# Patient Record
Sex: FEMALE | Race: WHITE | NOT HISPANIC OR LATINO | Employment: FULL TIME | URBAN - METROPOLITAN AREA
[De-identification: names, ages, dates, MRNs, and addresses within clinical notes are randomized per-mention and may not be internally consistent; named-entity substitution may affect disease eponyms.]

---

## 2017-10-18 ENCOUNTER — TELEPHONE (OUTPATIENT)
Dept: MIDWIFE SERVICES | Facility: CLINIC | Age: 37
End: 2017-10-18

## 2017-10-19 NOTE — TELEPHONE ENCOUNTER
Patient is about 6 weeks pregnant. First day of last period was 9/6/17. Please call to schedule. Okay to leave detailed messages.    Yari MALDONADO  Central Scheduler

## 2017-10-25 ENCOUNTER — PRENATAL OFFICE VISIT (OUTPATIENT)
Dept: NURSING | Facility: CLINIC | Age: 37
End: 2017-10-25
Payer: COMMERCIAL

## 2017-10-25 VITALS
BODY MASS INDEX: 21.47 KG/M2 | DIASTOLIC BLOOD PRESSURE: 79 MMHG | TEMPERATURE: 98.1 F | HEART RATE: 66 BPM | HEIGHT: 68 IN | WEIGHT: 141.7 LBS | SYSTOLIC BLOOD PRESSURE: 125 MMHG

## 2017-10-25 DIAGNOSIS — O09.529 AMA (ADVANCED MATERNAL AGE) MULTIGRAVIDA 35+: Primary | ICD-10-CM

## 2017-10-25 PROBLEM — Z23 NEED FOR TDAP VACCINATION: Status: ACTIVE | Noted: 2017-10-25

## 2017-10-25 LAB
ABO + RH BLD: NORMAL
ABO + RH BLD: NORMAL
ALBUMIN UR-MCNC: NEGATIVE MG/DL
APPEARANCE UR: CLEAR
BILIRUB UR QL STRIP: NEGATIVE
BLD GP AB SCN SERPL QL: NORMAL
BLOOD BANK CMNT PATIENT-IMP: NORMAL
COLOR UR AUTO: YELLOW
ERYTHROCYTE [DISTWIDTH] IN BLOOD BY AUTOMATED COUNT: 13.2 % (ref 10–15)
GLUCOSE UR STRIP-MCNC: NEGATIVE MG/DL
HCT VFR BLD AUTO: 36.6 % (ref 35–47)
HGB BLD-MCNC: 12.5 G/DL (ref 11.7–15.7)
HGB UR QL STRIP: NEGATIVE
KETONES UR STRIP-MCNC: NEGATIVE MG/DL
LEUKOCYTE ESTERASE UR QL STRIP: NEGATIVE
MCH RBC QN AUTO: 30.6 PG (ref 26.5–33)
MCHC RBC AUTO-ENTMCNC: 34.2 G/DL (ref 31.5–36.5)
MCV RBC AUTO: 90 FL (ref 78–100)
NITRATE UR QL: NEGATIVE
PH UR STRIP: 6 PH (ref 5–7)
PLATELET # BLD AUTO: 293 10E9/L (ref 150–450)
RBC # BLD AUTO: 4.09 10E12/L (ref 3.8–5.2)
SOURCE: NORMAL
SP GR UR STRIP: 1.02 (ref 1–1.03)
SPECIMEN EXP DATE BLD: NORMAL
UROBILINOGEN UR STRIP-ACNC: 0.2 EU/DL (ref 0.2–1)
WBC # BLD AUTO: 11 10E9/L (ref 4–11)

## 2017-10-25 PROCEDURE — 86762 RUBELLA ANTIBODY: CPT | Performed by: ADVANCED PRACTICE MIDWIFE

## 2017-10-25 PROCEDURE — 87389 HIV-1 AG W/HIV-1&-2 AB AG IA: CPT | Performed by: ADVANCED PRACTICE MIDWIFE

## 2017-10-25 PROCEDURE — 87086 URINE CULTURE/COLONY COUNT: CPT | Performed by: ADVANCED PRACTICE MIDWIFE

## 2017-10-25 PROCEDURE — 36415 COLL VENOUS BLD VENIPUNCTURE: CPT | Performed by: ADVANCED PRACTICE MIDWIFE

## 2017-10-25 PROCEDURE — 86900 BLOOD TYPING SEROLOGIC ABO: CPT | Performed by: ADVANCED PRACTICE MIDWIFE

## 2017-10-25 PROCEDURE — 81003 URINALYSIS AUTO W/O SCOPE: CPT | Performed by: ADVANCED PRACTICE MIDWIFE

## 2017-10-25 PROCEDURE — 86850 RBC ANTIBODY SCREEN: CPT | Performed by: ADVANCED PRACTICE MIDWIFE

## 2017-10-25 PROCEDURE — 86780 TREPONEMA PALLIDUM: CPT | Performed by: ADVANCED PRACTICE MIDWIFE

## 2017-10-25 PROCEDURE — 86901 BLOOD TYPING SEROLOGIC RH(D): CPT | Performed by: ADVANCED PRACTICE MIDWIFE

## 2017-10-25 PROCEDURE — 99207 ZZC NO CHARGE NURSE ONLY: CPT

## 2017-10-25 PROCEDURE — 87340 HEPATITIS B SURFACE AG IA: CPT | Performed by: ADVANCED PRACTICE MIDWIFE

## 2017-10-25 PROCEDURE — 85027 COMPLETE CBC AUTOMATED: CPT | Performed by: ADVANCED PRACTICE MIDWIFE

## 2017-10-25 RX ORDER — PNV NO.95/FERROUS FUM/FOLIC AC 28MG-0.8MG
TABLET ORAL
COMMUNITY
End: 2022-03-01

## 2017-10-25 NOTE — NURSING NOTE
"Chief Complaint   Patient presents with     Prenatal Care       Initial /79  Pulse 66  Temp 98.1  F (36.7  C)  Ht 5' 8\" (1.727 m)  Wt 141 lb 11.2 oz (64.3 kg)  LMP 09/06/2017  BMI 21.55 kg/m2 Estimated body mass index is 21.55 kg/(m^2) as calculated from the following:    Height as of this encounter: 5' 8\" (1.727 m).    Weight as of this encounter: 141 lb 11.2 oz (64.3 kg).  Medication Reconciliation: complete    "

## 2017-10-25 NOTE — MR AVS SNAPSHOT
After Visit Summary   10/25/2017    Moon Ordoñez    MRN: [id number]           Patient Information     Date Of Birth          1980        Visit Information        Provider Department      10/25/2017 3:30 PM RD OB NURSE EDUCATION [facility]        Today's Diagnoses     AMA (advanced maternal age) multigravida 35+    -  1       Follow-ups after your visit        Your next 10 appointments already scheduled     Nov 17, 2017  4:30 PM CST   New Prenatal with DORINDA Nevarez CNM   [facility] ([facility])    [address] [place] [id number] [phone]              Who to contact     If you have questions or need follow up information about today's clinic visit or your schedule please contact [facility] directly at [phone].  Normal or non-critical lab and imaging results will be communicated to you by MyChart, letter or phone within 4 business days after the clinic has received the results. If you do not hear from us within 7 days, please contact the clinic through [employer] or phone. If you have a critical or abnormal lab result, we will notify you by phone as soon as possible.  Submit refill requests through [employer] or call your pharmacy and they will forward the refill request to us. Please allow 3 business days for your refill to be completed.          Additional Information About Your Visit        MyChart Information     [employer] gives you secure access to your electronic health record. If you see a primary care provider, you can also send messages to your care team and make appointments. If you have questions, please call your primary care clinic.  If you do not have a primary care provider, please call [phone] and they will assist you.        Care EveryWhere ID     This is your Care EveryWhere ID. This could be used by other organizations to access your [facility]  "records  [id number]        Your Vitals Were     Pulse Temperature Height Last Period BMI (Body Mass Index)       66 98.1  F (36.7  C) 5' 8\" (1.727 m) 09/06/2017 21.55 kg/m2        Blood Pressure from Last 3 Encounters:   10/25/17 125/79    Weight from Last 3 Encounters:   10/25/17 141 lb 11.2 oz (64.3 kg)              We Performed the Following     ABO/RH Type and Screen     Anti Treponema     CBC with Platelets     Hepatitis B surface antigen     HIV Antigen Antibody Combo     Rubella Antibody IgG Quantitative     UA without Microscopic     Urine Culture Aerobic Bacterial        Primary Care Provider Office Phone # Fax #    Marychuy Murphy [phone]       [facility] [address] [facility] [address]        Equal Access to Services     GILL QUICK : Hadii aad ku hadasho Soomaali, waaxda luqadaha, qaybta kaalmada adeegyada, jose shen . So [facility] [phone].    ATENCIÓN: Si habla español, tiene a bentiez disposición servicios gratuitos de asistencia lingüística. Llame al [phone].    We comply with applicable federal civil rights laws and [place] laws. We do not discriminate on the basis of race, color, national origin, age, disability, sex, sexual orientation, or gender identity.            Thank you!     Thank you for choosing [facility]  for your care. Our goal is always to provide you with excellent care. Hearing back from our patients is one way we can continue to improve our services. Please take a few minutes to complete the written survey that you may receive in the mail after your visit with us. Thank you!             Your Updated Medication List - Protect others around you: Learn how to safely use, store and throw away your medicines at www.disposemymeds.org.          This list is accurate as of: 10/25/17  4:00 PM.  Always use your most recent med list.                   Brand Name Dispense Instructions for use " Diagnosis    Prenatal Vitamin 27-0.8 MG Tabs

## 2017-10-25 NOTE — PROGRESS NOTES
Patient presents for new ob teaching and labs, first pregnancy. Declined first trimester screening. Handouts reviewed and given. Patient complain of slight nausea, recommended B6 and Unisom. Has appointment with CNM 11/17/17    Caffeine intake/servings daily - 1  Calcium intake/servings daily - 3  Exercise 5 times weekly - describe ; bikes, cardio, gym membership, exercise precautions given  Sunscreen used - Yes  Seatbelts used - Yes  Guns stored in the home - No  Self Breast Exam - Yes  Pap test up to date -  Yes, done at [facility] 8/21/15 normal  Eye exam up to date -  Yes  Dental exam up to date -  Yes  DEXA scan up to date -  No  Flex Sig/Colonoscopy up to date -  No  Mammography up to date -  No  Immunizations reviewed and up to date - Yes  Abuse: Current or Past (Physical, Sexual or Emotional) - No  Do you feel safe in your environment - Yes  Do you cope well with stress - Yes  Do you suffer from insomnia - No    Prenatal OB Questionnaire  Past Medical History  Diabetes   No  Hypertension   No  Heart Disease, mitral valve prolapse, or rheumatic fever?   No  An autoimmune disorder such as Lupus or Rheumatoid Arthritis?   No  Kidney Disease or Urinary Tract Infection?   No  Epilepsy, seizures or spells?   No  Migraine headaches?   No  A stroke or loss of function or sensation?   No  Any other neurological problems?   No  Have you ever been treated for depression?  No  Are you having problems with crying spells or loss of self-esteem?   No  Have you ever required psychiatric care?   No  Have you ever hepatitis, liver disease or jaundice?   No  Have you ever been treated for blood clots in your veins, deep venous thrombosis, inflammation in the veins, thrombosis, phlebitis, pulmonary embolism or varicosities?   No  Have you had excessive bleeding after surgery or dental work?   No  Do you bleed more than other women after a cut or scratch?   No  Do you have a history of anemia?   No  Have you ever been  treated for thyroid problems or taken thyroid medication?  No  Do you have any other endocrine problems?  No  Have you ever been in a major accident or suffered serious trauma?   No  Within the last year, has anyone hit slapped, kicked or otherwise hurt you?  No  In the last year, has anyone forced you to have sex when you didn't want to?  No  Have you ever had a blood transfusion?   No  Would you refuse a blood transfusion if a doctor judged it to be medically necessary?   No  If you answered yes, would you rather die than have a blood transfusion?   No  If you answered yes, is this for Yazidi reasons?   No  Does anyone in your home smoke?   No  Do you use tobacco products?  No  Do you drink beer, wine, hard liquor?  No  Do you use any of the following: marijuana, speed, cocaine, heroine, hallucinogens, or other drugs?  No  Is your blood type Rh negative?   No  Have you ever had abnormal antibodies in your blood?   No  Have you ever had asthma?   No  Have you ever had tuberculosis?   No  Do you have any allergies to drugs or over-the-counter medications?   No    Allergies as of 10/25/2017:    Allergies as of 10/25/2017     (No Known Allergies)       Do you have any breast problems?   No  Have you ever ?   No  Have you had any gynecological surgical procedures such as cervical conization, a LEEP procedure, laser treatment, cryosurgery of the cervix, or a dilation and curettage, etc?  No  Have you had any other surgical procedures?  No  Have you been hospitalized for a nonsurgical reason excluding normal delivery?   No  Have you ever had any anesthetic complications?   No  Have you ever had an abnormal pap smear?   No  Do you have a history of abnormalities of the uterus?   No  Did it take you more than one year to become pregnant?   No  Have you ever been evaluated or treated for infertility?   No  Is there a history of medical problems in your family, which you feel might adversely affect your health or  pregnancy?   No  Do you have any other problems we have not asked you about which you feel may be important to this pregnancy?  No    Symptoms since Last Menstrual Period  Do you have any of the following:    *abdominal pain  No  *blood in stool or urine  No  *chest pain  No  *shortness of breath  No  *coughing or vomiting up blood No  *heart racing or skipping beats  No  *nausea and vomiting  Yes  *pain with urination  No  *vaginal discharge or bleeding  No  Current medications are:  Current Outpatient Prescriptions   Medication Sig Dispense Refill     Prenatal Vit-Fe Fumarate-FA (PRENATAL VITAMIN) 27-0.8 MG TABS          Genetic Screening  At the time of birth, will you be 35 years old or older?  No  Has the patient, baby s father, or anyone in either family had:  Thalassemia (Italian, Greek, Mediterranean, or  background only) and an MCV result less than 80?  No  Neural tube defect such as meningomyelocele, spina bifida or anencephaly?  No  Congenital heart defect?  No  Down s syndrome?  No  Maximiliano-Sach s disease (Mandaen, Cajun, Bhutanese-Macedonian)?  No  Sickle cell disease or trait (Krystle)?  No  Hemophilia or other inherited problems of blood coagulation? No  Muscular dystrophy?  No  Cystic Fibrosis?  No  Coffey s chorea?  No  Mental retardation/autism? No   If yes, was the person tested for fragile X?  No  Any other inherited genetic or chromosomal disorder?  No  Maternal metabolic disorder (e.g. insulin-dependent diabetes, PKU)? No  A child with birth defects not listed above?  No  Recurrent pregnancy loss or a stillbirth?  No  Has the patient had any medications/street drugs/alcohol since her last menstrual period? No  Does the patient or baby s father have any other genetic risks?  No  Infection History  Do you object to being tested for Hepatitis B? No  Do you object to being tested for HIV? No  Do you feel that you are at high risk for coming in contact with the AIDS virus?  No  Have you ever been  treated for tuberculosis?  No  Have you ever received the BCG vaccine for tuberculosis?  No  Have you ever had a positive skin test for tuberculosis? No  Do you live with someone who has tuberculosis?  No  Have you ever been exposed to tuberculosis?  No  Do you have genital herpes?  No  Does your partner have genital herpes?  No  Have you had a rash or viral illness since your last period?  No  Have you ever had Gonorrhea, Chlamydia, Syphilis, venereal warts, trichomoniasis, pelvic inflammatory disease or any other sexually transmitted disease?  No  Do you know if you are a genital group B streptococcus carrier? No  You have not had chicken pox/varicella  Yes  Have you been vaccinated against chicken pox?  No  Have you had any other infectious disease? No        Early ultrasound screening tool:    Does patient have irregular periods?  No  Did patient use hormonal birth control in the three months prior to positive urine pregnancy test? No  Is the patient breastfeeding?  No  Is the patient 10 weeks or greater at time of education visit?  No

## 2017-10-26 LAB
BACTERIA SPEC CULT: NORMAL
HBV SURFACE AG SERPL QL IA: NONREACTIVE
HIV 1+2 AB+HIV1 P24 AG SERPL QL IA: NONREACTIVE
RUBV IGG SERPL IA-ACNC: 91 IU/ML
SPECIMEN SOURCE: NORMAL
T PALLIDUM IGG+IGM SER QL: NEGATIVE

## 2017-10-28 ENCOUNTER — HEALTH MAINTENANCE LETTER (OUTPATIENT)
Age: 37
End: 2017-10-28

## 2017-11-17 ENCOUNTER — PRENATAL OFFICE VISIT (OUTPATIENT)
Dept: MIDWIFE SERVICES | Facility: CLINIC | Age: 37
End: 2017-11-17
Payer: COMMERCIAL

## 2017-11-17 VITALS
DIASTOLIC BLOOD PRESSURE: 87 MMHG | SYSTOLIC BLOOD PRESSURE: 140 MMHG | BODY MASS INDEX: 22.13 KG/M2 | HEART RATE: 73 BPM | WEIGHT: 146 LBS | HEIGHT: 68 IN

## 2017-11-17 DIAGNOSIS — O09.521 ELDERLY MULTIGRAVIDA IN FIRST TRIMESTER: Primary | ICD-10-CM

## 2017-11-17 PROBLEM — Z34.00 SUPERVISION OF NORMAL FIRST PREGNANCY: Status: ACTIVE | Noted: 2017-11-17

## 2017-11-17 PROCEDURE — 99207 ZZC FIRST OB VISIT: CPT | Performed by: ADVANCED PRACTICE MIDWIFE

## 2017-11-17 NOTE — PROGRESS NOTES
"10w2d   Moon Ordoñez is a 36 year old who presents to the clinic for an new ob visit. She is not a previous CNM patient. They have good questions today about pregnancy in general otherwise no concerns. Feeling well, nausea is starting to resolve. Patient is AMA, they would like to complete genetic testing, ordered through [facility]. Interested in shared wisdom, information given for the [facility] group.     Estimated Date of Delivery: Jun 13, 2018 is calculated from Patient's last menstrual period was 09/06/2017.     She has not had bleeding since her LMP.   She has had mild nausea. Weigh loss has not occurred.   This was a planned pregnancy.   FOB is involved,  Reyes   OTHER CONCERNS: no concerns     INFECTION HISTORY  HIV: no  Hepatitis B: no  Hepatitis C: no  Syphilis:  no  Tuberculosis: no   PPD- no  Herpes self: no  Herpes partner:  no  Chlamydia:  no  Gonorrhea:  no  HPV: no  BV:  no  Trichomonis:  no  Chicken Pox:  YES  ====================================================  GENETIC SCREENING  Genetic screening reviewed. High Risk? No   ====================================================  PERSONAL/SOCIAL HISTORY  Lives lives with their spouse.  Employment: Full time.  Her job involves light activity .  HX OF ABUSE: no  =====================================================   REVIEW OF SYSTEMS  C: NEGATIVE for fever, chills  E: NEGATIVE for vision changes   R: NEGATIVE for significant cough or SOB  CV: NEGATIVE for chest pain, palpitations   GI: NEGATIVE for nausea, abdominal pain, heartburn, or change in bowel habits  : NEGATIVE for frequency, dysuria, or hematuria  M: NEGATIVE for significant arthralgias or myalgia  N: NEGATIVE for weakness, dizziness or paresthesias or headache  ====================================================    PHYSICAL EXAM:  /87  Pulse 73  Ht 5' 8\" (1.727 m)  Wt 146 lb (66.2 kg)  LMP 09/06/2017  BMI 22.2 kg/m2  BMI- Body mass index is 22.2 kg/(m^2).,     RECOMMENDED WEIGHT " GAIN: 15-25 lbs.  PHQ9- Today's Depression Rating was No Value exists for the : HP#PHQ9  GENERAL:  Pleasant pregnant female, alert, well groomed.   SKIN:  Warm and dry, without lesions or rashes  HEAD: Symmetrical features.  MOUTH:  Buccal mucosa pink, moist without lesions.    NECK:  Thyroid without enlargement and nodules.  Lymph nodes not palpable.   LUNGS:  Clear to auscultation.  HEART:  RRR without murmur.  ABDOMEN: Soft without masses , tenderness or organomegaly.  No CVA tenderness. No scars noted.     MUSCULOSKELETAL:  Full range of motion  EXTREMITIES:  No edema. No significant varicosities.   GENITALIA: deferred.    =========================================  ASSESSMENT:  10w2d  AMA    PREGNANCY AT RISK? no  ==========================================  PLAN:  Instructed on use of triage nurse line and contacting the on call CNM after hours for an urgent need such as fever, vagina bleeding, bladder or vaginal infection, rupture of membranes,  or term labor.    Discussed the indications, uses for and false positives for quad screen, nuchal translucency and fetal survey ultrasound at 18-20 weeks gestation. Ordered today.   Instructed on best evidence for: weight gain for her BMI for pregnancy; healthy diet and foods to avoid; exercise and activity during pregnancy;avoiding exposure to toxoplasmosis; and maintenance of a generally healthy lifestyle.   Discussed the harms, benefits, side effects and alternative therapies for current prescribed and OTC medications.  Follow up in 4 weeks.     DORINDA Nevarez CNM

## 2017-11-17 NOTE — Clinical Note
Please abstract the following data from this visit with this patient into the appropriate field in Epic:  Pap smear done on this date: 08/21/2015 (approximately), by this group: [facility], results were NIL.

## 2017-11-17 NOTE — NURSING NOTE
"Chief Complaint   Patient presents with     Prenatal Care       Initial /87  Pulse 73  Ht 5' 8\" (1.727 m)  Wt 146 lb (66.2 kg)  LMP 2017  BMI 22.2 kg/m2 Estimated body mass index is 22.2 kg/(m^2) as calculated from the following:    Height as of this encounter: 5' 8\" (1.727 m).    Weight as of this encounter: 146 lb (66.2 kg).  BP completed using cuff size: regular        The following HM Due: NONE      The following patient reported/Care Every where data was sent to:  P ABSTRACT QUALITY INITIATIVES [[id number]]  na     n/a             "

## 2017-11-17 NOTE — MR AVS SNAPSHOT
After Visit Summary   11/17/2017    Moon Ordoñez    MRN: [id number]           Patient Information     Date Of Birth          1980        Visit Information        Provider Department      11/17/2017 4:00 PM Francesca Holt APRN CNM [facility]        Today's Diagnoses     Elderly multigravida in first trimester    -  1       Follow-ups after your visit        Additional Services     MAT FETAL MED CTR REFERRAL-PREGNANCY       >> Patient may proceed with recommendations for further testing as directed by the Maternal Fetal Medicine Specialist >>    >> If requesting Fetal Echo: M will determine appropriate location for exam due to indication.    >> If requesting Lung Maturity Amnio:  If results indicate fetal lung maturity, induction or C/S is recommended within 36 hours.  Please schedule accordingly.     Dear Patient:   Please be aware that coverage of these services is subject to the terms and limitations of your health insurance plan.  Call member services at your health plan with any benefit or coverage questions.      Please bring the following to your appointment:    >>  Any x-rays, CTs or MRIs which have been performed.  Contact the facility where they were done to arrange for  prior to your scheduled appointment.  Any new CT, MRI or other procedures ordered by your specialist must be performed at a [facility] facility or coordinated by your clinic's referral office.  >>  List of current medications   >>  This referral request   >>  Any documents/labs given to you for this referral                  Your next 10 appointments already scheduled     Dec 19, 2017  5:30 PM CST   ESTABLISHED PRENATAL with DORINDA Santana CNM   [facility] ([facility])    [address] [place] [id number] [phone]              Who to contact     If you have questions or need follow up information about today's  "clinic visit or your schedule please contact [facility] directly at [phone].  Normal or non-critical lab and imaging results will be communicated to you by MyChart, letter or phone within 4 business days after the clinic has received the results. If you do not hear from us within 7 days, please contact the clinic through [employer] or phone. If you have a critical or abnormal lab result, we will notify you by phone as soon as possible.  Submit refill requests through "[employer] or call your pharmacy and they will forward the refill request to us. Please allow 3 business days for your refill to be completed.          Additional Information About Your Visit        [employer] Information     "[employer] gives you secure access to your electronic health record. If you see a primary care provider, you can also send messages to your care team and make appointments. If you have questions, please call your primary care clinic.  If you do not have a primary care provider, please call [phone] and they will assist you.        Care EveryWhere ID     This is your Care EveryWhere ID. This could be used by other organizations to access your [facility] medical records  [id number]        Your Vitals Were     Pulse Height Last Period Breastfeeding? BMI (Body Mass Index)       73 5' 8\" (1.727 m) 09/06/2017 Unknown 22.2 kg/m2        Blood Pressure from Last 3 Encounters:   11/17/17 140/87   10/25/17 125/79    Weight from Last 3 Encounters:   11/17/17 146 lb (66.2 kg)   10/25/17 141 lb 11.2 oz (64.3 kg)              We Performed the Following     MAT FETAL MED CTR REFERRAL-PREGNANCY        Primary Care Provider Office Phone # Fax #    Marychuy Murphy [phone]        [facility] [address] [facility] [address]        Equal Access to Services     GILL QUICK : Shelia Mendieta, ashanti velasquez, lila dougherty, jose maddox. So wa " [phone].    ATENCIÓN: Si habla vivien, tiene a benitez disposición servicios gratuitos de asistencia lingüística. Nae al [phone].    We comply with applicable federal civil rights laws and [place] laws. We do not discriminate on the basis of race, color, national origin, age, disability, sex, sexual orientation, or gender identity.            Thank you!     Thank you for choosing [facility]  for your care. Our goal is always to provide you with excellent care. Hearing back from our patients is one way we can continue to improve our services. Please take a few minutes to complete the written survey that you may receive in the mail after your visit with us. Thank you!             Your Updated Medication List - Protect others around you: Learn how to safely use, store and throw away your medicines at www.disposemymeds.org.          This list is accurate as of: 11/17/17  5:21 PM.  Always use your most recent med list.                   Brand Name Dispense Instructions for use Diagnosis    Prenatal Vitamin 27-0.8 MG Tabs

## 2017-12-06 ENCOUNTER — PRE VISIT (OUTPATIENT)
Dept: MATERNAL FETAL MEDICINE | Facility: CLINIC | Age: 37
End: 2017-12-06

## 2017-12-08 ENCOUNTER — OFFICE VISIT (OUTPATIENT)
Dept: MATERNAL FETAL MEDICINE | Facility: CLINIC | Age: 37
End: 2017-12-08
Attending: ADVANCED PRACTICE MIDWIFE
Payer: COMMERCIAL

## 2017-12-08 ENCOUNTER — HOSPITAL ENCOUNTER (OUTPATIENT)
Dept: ULTRASOUND IMAGING | Facility: CLINIC | Age: 37
Discharge: HOME | End: 2017-12-08
Attending: ADVANCED PRACTICE MIDWIFE | Admitting: SOCIAL WORKER
Payer: COMMERCIAL

## 2017-12-08 DIAGNOSIS — O26.90 PREGNANCY RELATED CONDITION, ANTEPARTUM: ICD-10-CM

## 2017-12-08 DIAGNOSIS — O09.521 ELDERLY MULTIGRAVIDA IN FIRST TRIMESTER: Primary | ICD-10-CM

## 2017-12-08 DIAGNOSIS — O09.521 ELDERLY MULTIGRAVIDA IN FIRST TRIMESTER: ICD-10-CM

## 2017-12-08 DIAGNOSIS — Z36.82 ENCOUNTER FOR (NT) NUCHAL TRANSLUCENCY SCAN: ICD-10-CM

## 2017-12-08 PROCEDURE — 36415 COLL VENOUS BLD VENIPUNCTURE: CPT | Performed by: OBSTETRICS & GYNECOLOGY

## 2017-12-08 PROCEDURE — 96040 ZZH GENETIC COUNSELING, EACH 30 MINUTES: CPT | Mod: ZF | Performed by: GENETIC COUNSELOR, MS

## 2017-12-08 PROCEDURE — 76813 OB US NUCHAL MEAS 1 GEST: CPT

## 2017-12-08 PROCEDURE — 40000791 ZZHCL STATISTIC VERIFI PRENATAL TRISOMY 21,18,13: Performed by: OBSTETRICS & GYNECOLOGY

## 2017-12-08 NOTE — MR AVS SNAPSHOT
After Visit Summary   12/8/2017    Moon Ordoñez    MRN: [id number]           Patient Information     Date Of Birth          1980        Visit Information        Provider Department      12/8/2017 10:45 AM Anne Marie Yarbrough MD [facility] Maternal Fetal Medicine [facility]        Today's Diagnoses     Elderly multigravida in first trimester    -  1    Encounter for (NT) nuchal translucency scan           Follow-ups after your visit        Your next 10 appointments already scheduled     Dec 19, 2017  5:30 PM CST   ESTABLISHED PRENATAL with DORINDA Santana CNM   [facility] ([facility])    [address] [facility]  [address]  [facility] [address] [phone]            Jan 19, 2018  1:30 PM CST   (Arrive by 1:15 PM)   MFM US COMP with [id number]   [facility] Maternal Fetal Medicine Ultrasound - [facility])    [address]  [facility] [id number] [phone]           Wear comfortable clothes and leave your valuables at home.            Jan 19, 2018  2:00 PM CST   Radiology MD with UR BRAD JOINER   [facility] Maternal Fetal Medicine - [facility])    [address]  [facility] [id number] [phone]           Please arrive at the time given for your first appointment. This visit is used internally to schedule the physician's time during your ultrasound.              Who to contact     If you have questions or need follow up information about today's clinic visit or your schedule please contact [facility] MATERNAL FETAL MEDICINE [facility] directly at [phone].  Normal or non-critical lab and imaging results will be communicated to you by MyChart, letter or phone within 4 business days after the clinic has received the results. If you do not hear from us within 7 days, please contact the clinic through MyChart or phone. If you have a critical  or abnormal lab result, we will notify you by phone as soon as possible.  Submit refill requests through PetroDE or call your pharmacy and they will forward the refill request to us. Please allow 3 business days for your refill to be completed.          Additional Information About Your Visit        Sakti3hart Information     [employer] gives you secure access to your electronic health record. If you see a primary care provider, you can also send messages to your care team and make appointments. If you have questions, please call your primary care clinic.  If you do not have a primary care provider, please call [phone] and they will assist you.        Care EveryWhere ID     This is your Care EveryWhere ID. This could be used by other organizations to access your [place] medical records  [id number]        Your Vitals Were     Last Period                   09/06/2017            Blood Pressure from Last 3 Encounters:   11/17/17 140/87   10/25/17 125/79    Weight from Last 3 Encounters:   11/17/17 66.2 kg (146 lb)   10/25/17 64.3 kg (141 lb 11.2 oz)              Today, you had the following     No orders found for display       Primary Care Provider Office Phone # Fax #    Marychuy Murphy [phone]       [facility] [address] [facility] [address]        Equal Access to Services     GILL QUICK AH: Hadii noe ku hadasho Soomaali, waaxda luqadaha, qaybta kaalmada adeegyada, jose maddox. So [facility] [phone].    ATENCIÓN: Si habla español, tiene a benitez disposición servicios gratuitos de asistencia lingüística. Llame al [phone].    We comply with applicable federal civil rights laws and [place] laws. We do not discriminate on the basis of race, color, national origin, age, disability, sex, sexual orientation, or gender identity.            Thank you!     Thank you for choosing [facility]  for your care. Our goal is  always to provide you with excellent care. Hearing back from our patients is one way we can continue to improve our services. Please take a few minutes to complete the written survey that you may receive in the mail after your visit with us. Thank you!             Your Updated Medication List - Protect others around you: Learn how to safely use, store and throw away your medicines at www.disposemymeds.org.          This list is accurate as of: 12/8/17 12:50 PM.  Always use your most recent med list.                   Brand Name Dispense Instructions for use Diagnosis    Prenatal Vitamin 27-0.8 MG Tabs

## 2017-12-08 NOTE — PROGRESS NOTES
[facility] Maternal Fetal Medicine Center  Genetic Counseling Consult    Patient: Moon Ordoñez YOB: 1980     Date of Service: 17     Referring Provider: Francesca Holt CNM        Moon Ordoñez was seen, along with her , Reyes, at [facility] M[facility]etal [facility] Center for genetic consultation to discuss the options for screening and testing for fetal chromosome abnormalities.  The indication for genetic counseling is advanced maternal age.        Impression/Plan:   1.  Moon had an ultrasound and blood draw for NIPT (Innatal test through [facility]).  Results are expected within 7-10 days, and will be available in [facility].  We will contact her to discuss the results, and a copy will be forwarded to the office of the referring OB provider.    2.  Maternal serum AFP (single marker screen) is recommended after 15 weeks to screen for open neural tube defects.  A quad screen should not be performed.    3.  An 18-20 week comprehensive ultrasound is standard of care for all women 35 or older at delivery.  This has been scheduled for 18.    Pregnancy History:   /Parity:   Age at Delivery: 36 year old  KELTON: 2018, by LMP  Gestational Age: 13w2d    No significant complications or exposures were reported in the current pregnancy.    Medical History:   Moon s reported medical history is not expected to impact pregnancy management or risks to fetal development.  Moon is 36 years of age and she is healthy.  Her , Reyes, is 38 years of age.  He reports a history of seizures but he is otherwise healthy.       Family History:   A three-generation pedigree was obtained and is scanned under the  Media  tab.  The following significant findings were reported:      As mentioned above, Reyes has a history of seizures with onset at age 14 following a surgery.  He is not aware of any other similarly affected relatives.  We discussed how many  forms of epilepsy have a genetic basis, and that the couple should be sure to share this information with their pediatrician.      One of Reyes's paternal aunts  in her late 60's from complications of scleroderma.  We discussed that most autoimmune disorders are associated with some degree of genetic susceptibility, and that this family history should prompt heightened awareness.      Moon has a paternal half sister who had thyroid cancer in her 20's.  Moon's father had prostate cancer at age 60.  Although this family history is not necessarily strongly suggestive of a hereditary cancer syndrome, this family history should also prompt heightened awareness.      Moon's maternal grandmother reportedly had multiple miscarriages.  Recurrent pregnancy loss can have many causes, and in many cases the underlying cause is never identified.  If Moon were to experience multiple miscarriages in the future, it would be reasonable to offer a recurrent miscarriage evaluation including blood chromosome testing.    The reported family history is otherwise negative for multiple miscarriages, stillbirths, birth defects, mental retardation, known genetic conditions, and consanguinity.       Carrier Screening:   The patient reports that she and the father of the pregnancy have  ancestry:       Cystic fibrosis is an autosomal recessive genetic condition that occurs with increased frequency in individuals of  ancestry, and carrier screening for this condition is available.  In addition,  screening in the [facility] includes cystic fibrosis.      Expanded carrier screening is available for a large panel of genetic conditions including cystic fibrosis, spinal muscular atrophy, fragile X syndrome and many others.      The patient has declined the carrier screening options reviewed today.       Risk Assessment for Chromosome Conditions:   It was explained that the risk for fetal chromosome  abnormalities increases with maternal age.  We discussed specific features of common chromosome abnormalities including Down syndrome, trisomy 18, trisomy 13, and the sex chromosome trisomies.      At age 36 at midtrimester, the risk to have a baby with Down syndrome is about 1 in 274.     At age 36 at midtrimester, the risk to have a baby with any chromosome abnormality is about 1 in 135.        Testing Options:   We discussed the following options:     First trimester screening    First trimester ultrasound with nuchal translucency and nasal bone assessments, maternal plasma hCG, YUE-A, and AFP measurement    Screens for fetal trisomy 21, trisomy 13, and trisomy 18    Cannot screen for open neural tube defects; maternal serum AFP after 15 weeks is recommended     Non-invasive Prenatal Testing (NIPT)    Maternal plasma cell-free DNA testing; first trimester ultrasound with nuchal translucency and nasal bone assessment is recommended, when appropriate    Screens for fetal trisomy 21, trisomy 13, trisomy 18, and sex chromosome aneuploidy    Cannot screen for open neural tube defects; maternal serum AFP after 15 weeks is recommended     Chorionic villus sampling (CVS)    Invasive procedure typically performed in the first trimester by which placental villi are obtained for the purpose of chromosome analysis and/or other prenatal genetic analysis    Diagnostic results; >99% sensitivity for fetal chromosome abnormalities    Cannot test for open neural tube defects; maternal serum AFP after 15 weeks is recommended     Genetic Amniocentesis    Invasive procedure typically performed in the second trimester by which amniotic fluid is obtained for the purpose of chromosome analysis and/or other prenatal genetic analysis    Diagnostic results; >99% sensitivity for fetal chromosome abnormalities    AFAFP measurement tests for open neural tube defects     Comprehensive (Level II) ultrasound:     Detailed ultrasound performed  between 18-22 weeks gestation to screen for major birth defects and markers for aneuploidy      We reviewed the benefits and limitations of this testing.  Screening tests provide a risk assessment specific to the pregnancy for certain fetal chromosome abnormalities, but cannot definitively diagnose or exclude a fetal chromosome abnormality.  Follow-up genetic counseling and consideration of diagnostic testing is recommended with any abnormal screening result.  Diagnostic tests carry inherent risks- including risk of miscarriage- that require careful consideration.  These tests can detect fetal chromosome abnormalities with greater than 99% certainty.  There is no screening nor diagnostic test that can detect all forms of birth defects or mental disability.     It was a pleasure to be involved with [facility].  Face-to-face time of the meeting was 45 minutes.      Chiqui Conde, [facility]  Certified Genetic Counselor  Pager: [phone]

## 2017-12-08 NOTE — PROGRESS NOTES
Please see ultrasound report under imaging tab for details on ultrasound performed today.    Anne Marie Yarbrough MD  , OB/GYN  Maternal-Fetal Medicine  [email or web address]  [phone] (Academic office)  [phone] (Pager)

## 2017-12-08 NOTE — MR AVS SNAPSHOT
After Visit Summary   12/8/2017    Moon Ordoñez    MRN: [id number]           Patient Information     Date Of Birth          1980        Visit Information        Provider Department      12/8/2017 9:30 AM Chiqui Conde GC [facility] Maternal Fetal Medicine [facility]        Today's Diagnoses     Elderly multigravida in first trimester    -  1    Pregnancy related condition, antepartum           Follow-ups after your visit        Your next 10 appointments already scheduled     Dec 19, 2017  5:30 PM CST   ESTABLISHED PRENATAL with DORINDA Santana CNM   [facility] ([facility])    [address] [facility]  [address]  [facility] [id number] [phone]            Jan 19, 2018  1:30 PM CST   (Arrive by 1:15 PM)   MFMARSHALL US COMP with [id number]   [facility] Maternal Fetal Medicine Ultrasound - [place] ([facility])    [address]  [facility] [address] [phone]           Wear comfortable clothes and leave your valuables at home.            Jan 19, 2018  2:00 PM CST   Radiology MD with UR BRAD JOINER   [facility] - [facility])    [address]  [facility] [address] [phone]           Please arrive at the time given for your first appointment. This visit is used internally to schedule the physician's time during your ultrasound.              Who to contact     If you have questions or need follow up information about today's clinic visit or your schedule please contact [facility] MATERNAL FETAL MEDICINE [facility] directly at [phone].  Normal or non-critical lab and imaging results will be communicated to you by MyChart, letter or phone within 4 business days after the clinic has received the results. If you do not hear from us within 7 days, please contact the clinic through MyChart or phone. If you have a critical or abnormal lab  result, we will notify you by phone as soon as possible.  Submit refill requests through [employer] or call your pharmacy and they will forward the refill request to us. Please allow 3 business days for your refill to be completed.          Additional Information About Your Visit        Zvooqhart Information     [employer] ([employer] gives you secure access to your electronic health record. If you see a primary care provider, you can also send messages to your care team and make appointments. If you have questions, please call your primary care clinic.  If you do not have a primary care provider, please call [phone] and they will assist you.        Care EveryWhere ID     This is your Care EveryWhere ID. This could be used by other organizations to access your [place] medical records  [id number]        Your Vitals Were     Last Period                   09/06/2017            Blood Pressure from Last 3 Encounters:   11/17/17 140/87   10/25/17 125/79    Weight from Last 3 Encounters:   11/17/17 66.2 kg (146 lb)   10/25/17 64.3 kg (141 lb 11.2 oz)              We Performed the Following     [facility] Genetic Counseling        Primary Care Provider Office Phone # Fax #    Marychuy Murphy [phone]       [facility] [address] [id number]        Equal Access to Services     GILL QUICK : Hadii noe padron hadasho Soomaali, waaxda luqadaha, qaybta kaalmada adeegyada, jose maddox. So [facility] [phone].    ATENCIÓN: Si habla español, tiene a benitez disposición servicios gratuitos de asistencia lingüística. Llame al [phone].    We comply with applicable federal civil rights laws and [place] laws. We do not discriminate on the basis of race, color, national origin, age, disability, sex, sexual orientation, or gender identity.            Thank you!     Thank you for choosing [facility]  for your care. Our goal is always to provide you  with excellent care. Hearing back from our patients is one way we can continue to improve our services. Please take a few minutes to complete the written survey that you may receive in the mail after your visit with us. Thank you!             Your Updated Medication List - Protect others around you: Learn how to safely use, store and throw away your medicines at www.disposemymeds.org.          This list is accurate as of: 12/8/17  2:19 PM.  Always use your most recent med list.                   Brand Name Dispense Instructions for use Diagnosis    Prenatal Vitamin 27-0.8 MG Tabs

## 2017-12-13 ENCOUNTER — TELEPHONE (OUTPATIENT)
Dept: MATERNAL FETAL MEDICINE | Facility: CLINIC | Age: 37
End: 2017-12-13

## 2017-12-13 NOTE — PROGRESS NOTES
I left a message for Moon on her dedicated voicemail explaining that her NIPT (Innatal) results are back and are normal/negative.  This means that there is a very low likelihood for the baby to have trisomy 21, trisomy 18, trisomy 13, or sex chromosome aneuploidy.  Fetal gender was NOT disclosed per the patient's prior request.  I explained that these results are very reassuring, though not definitive, and amniocentesis is not indicated at this time but remains an option.  I encouraged Moon to call me back if she has questions.    Serum AFP screening between 15-22 weeks is recommended to screen for open neural tube defects.      Chiqui Conde MS, [facility]  Certified Genetic Counselor  December 13, 2017  3:13 PM

## 2017-12-17 LAB — LAB SCANNED RESULT: NORMAL

## 2017-12-21 ENCOUNTER — PRENATAL OFFICE VISIT (OUTPATIENT)
Dept: MIDWIFE SERVICES | Facility: CLINIC | Age: 37
End: 2017-12-21
Payer: COMMERCIAL

## 2017-12-21 VITALS
SYSTOLIC BLOOD PRESSURE: 123 MMHG | OXYGEN SATURATION: 99 % | DIASTOLIC BLOOD PRESSURE: 82 MMHG | BODY MASS INDEX: 21.99 KG/M2 | HEART RATE: 85 BPM | WEIGHT: 144.6 LBS

## 2017-12-21 DIAGNOSIS — O09.522 ELDERLY MULTIGRAVIDA IN SECOND TRIMESTER: Primary | ICD-10-CM

## 2017-12-21 PROCEDURE — 99207 ZZC PRENATAL VISIT: CPT | Performed by: ADVANCED PRACTICE MIDWIFE

## 2017-12-21 NOTE — PROGRESS NOTES
15w1d   Patient feeling well. Denies any vaginal bleeding, water leaking, abdominal pain, or other concerns.  Discussed genetic testing. Needs AFP, ordered as future to get at her next visit before shared wisdom.   Discussed weight gain in pregnancy. No other questions or concerns at this time.   Danger signs discussed. Patient knows when to call triage and has numbers to call.   Follow up after M ultrasound for fetal survey and shared wisdom.    Francesca Holt CNM

## 2017-12-21 NOTE — MR AVS SNAPSHOT
After Visit Summary   12/21/2017    Moon Ordoñez    MRN: [id number]           Patient Information     Date Of Birth          1980        Visit Information        Provider Department      12/21/2017 4:45 PM Francesca Holt APRN [facility]        Today's Diagnoses     Elderly multigravida in second trimester    -  1       Follow-ups after your visit        Your next 10 appointments already scheduled     Jan 08, 2018  4:00 PM CST   ESTABLISHED PRENATAL with DORINDA Nevarez CNM   [facility] ([facility])    [address] [facility]  [address]  [facility] [address]   [phone]            Jan 19, 2018  1:30 PM CST   (Arrive by 1:15 PM)   BRAD US COMP with [id number]   [facility] Maternal Fetal Medicine Ultrasound - [facility])    [address]  [facility] [address] [phone]           Wear comfortable clothes and leave your valuables at home.            Jan 19, 2018  2:00 PM CST   Radiology MD with UR BRAD JOINER   [facility] Maternal Fetal Medicine - [facility])    [address]  [facility] [address]   [phone]           Please arrive at the time given for your first appointment. This visit is used internally to schedule the physician's time during your ultrasound.            Feb 05, 2018  4:00 PM CST   ESTABLISHED PRENATAL with DORINDA Nevarez CNM   [facility]    [address] [facility]  [address]  [facility] [address]   [phone]            Mar 05, 2018  4:00 PM CST   ESTABLISHED PRENATAL with DORINDA Nevarez CNM   [facility]    [address] [facility]  [address]  [facility] [address]   [phone]            Apr 02, 2018  4:00 PM CDT   ESTABLISHED PRENATAL with DORINDA Nevarez CNM    [facility]    [address] [place] [address]  [facility] [id number] [phone]            Apr 30, 2018  4:00 PM CDT   ESTABLISHED PRENATAL with DORINDA Nevarez CNM   [facility]    [address] [place] [address]  [facility] [address] [phone]              Future tests that were ordered for you today     Open Future Orders        Priority Expected Expires Ordered    Alpha fetoprotein maternal screen Routine  12/21/2018 12/21/2017            Who to contact     If you have questions or need follow up information about today's clinic visit or your schedule please contact [facility] directly at [phone].  Normal or non-critical lab and imaging results will be communicated to you by MyChart, letter or phone within 4 business days after the clinic has received the results. If you do not hear from us within 7 days, please contact the clinic through MyChart or phone. If you have a critical or abnormal lab result, we will notify you by phone as soon as possible.  Submit refill requests through [employer]IO-[employer]EMS or call your pharmacy and they will forward the refill request to us. Please allow 3 business days for your refill to be completed.          Additional Information About Your Visit        BIO-NEMS Information     [employer] gives you secure access to your electronic health record. If you see a primary care provider, you can also send messages to your care team and make appointments. If you have questions, please call your primary care clinic.  If you do not have a primary care provider, please call [phone] and they will assist you.        Care EveryWhere ID     This is your Care EveryWhere ID. This could be used by other organizations to access your [place] medical records  [id number]        Your Vitals Were     Pulse Last Period Pulse Oximetry BMI (Body Mass Index)          85  09/06/2017 99% 21.99 kg/m2         Blood Pressure from Last 3 Encounters:   12/21/17 123/82   11/17/17 140/87   10/25/17 125/79    Weight from Last 3 Encounters:   12/21/17 144 lb 9.6 oz (65.6 kg)   11/17/17 146 lb (66.2 kg)   10/25/17 141 lb 11.2 oz (64.3 kg)               Primary Care Provider Office Phone # Fax #    Marychuy Murphy [phone]       [facility] [address] [facility] [address]        Equal Access to Services     GILL QUICK : Hadii noe padron hadasho Sodanie, waaxda luqadaha, qaybta kaalmada adebriiyalb, jose shen . So [facility] [phone].    ATENCIÓN: Si habla español, tiene a benitez disposición servicios gratuitos de asistencia lingüística. Llame al [phone].    We comply with applicable federal civil rights laws and [place] laws. We do not discriminate on the basis of race, color, national origin, age, disability, sex, sexual orientation, or gender identity.            Thank you!     Thank you for choosing [facility]  for your care. Our goal is always to provide you with excellent care. Hearing back from our patients is one way we can continue to improve our services. Please take a few minutes to complete the written survey that you may receive in the mail after your visit with us. Thank you!             Your Updated Medication List - Protect others around you: Learn how to safely use, store and throw away your medicines at www.disposemymeds.org.          This list is accurate as of: 12/21/17  4:55 PM.  Always use your most recent med list.                   Brand Name Dispense Instructions for use Diagnosis    Prenatal Vitamin 27-0.8 MG Tabs

## 2018-01-08 ENCOUNTER — PRENATAL OFFICE VISIT (OUTPATIENT)
Dept: MIDWIFE SERVICES | Facility: CLINIC | Age: 38
End: 2018-01-08
Payer: COMMERCIAL

## 2018-01-08 VITALS
HEIGHT: 68 IN | SYSTOLIC BLOOD PRESSURE: 116 MMHG | BODY MASS INDEX: 22.13 KG/M2 | HEART RATE: 72 BPM | DIASTOLIC BLOOD PRESSURE: 72 MMHG | WEIGHT: 146 LBS

## 2018-01-08 DIAGNOSIS — O09.522 ELDERLY MULTIGRAVIDA IN SECOND TRIMESTER: ICD-10-CM

## 2018-01-08 DIAGNOSIS — Z34.02 ENCOUNTER FOR SUPERVISION OF NORMAL FIRST PREGNANCY IN SECOND TRIMESTER: Primary | ICD-10-CM

## 2018-01-08 PROCEDURE — 82105 ALPHA-FETOPROTEIN SERUM: CPT | Mod: 90 | Performed by: ADVANCED PRACTICE MIDWIFE

## 2018-01-08 PROCEDURE — 99000 SPECIMEN HANDLING OFFICE-LAB: CPT | Performed by: ADVANCED PRACTICE MIDWIFE

## 2018-01-08 PROCEDURE — 36415 COLL VENOUS BLD VENIPUNCTURE: CPT | Performed by: ADVANCED PRACTICE MIDWIFE

## 2018-01-08 PROCEDURE — 99207 ZZC PRENATAL VISIT: CPT | Performed by: ADVANCED PRACTICE MIDWIFE

## 2018-01-08 NOTE — MR AVS SNAPSHOT
After Visit Summary   1/8/2018    Moon Ordoñez    MRN: [id number]           Patient Information     Date Of Birth          1980        Visit Information        Provider Department      1/8/2018 4:00 PM Francesca Holt APRN CNM [facility]        Today's Diagnoses     Encounter for supervision of normal first pregnancy in second trimester    -  1    Elderly multigravida in second trimester           Follow-ups after your visit        Your next 10 appointments already scheduled     Jan 19, 2018  1:30 PM CST   (Arrive by 1:15 PM)   BRAD US COMP with [id number]   [facility] Ul[facility] - [facility])    [address]  [facility] [id number] [phone]           Wear comfortable clothes and leave your valuables at home.            Jan 19, 2018  2:00 PM CST   Radiology MD with UR BRAD JOINER   [facility] - [facility])    [address]  [facility] [address]   [phone]           Please arrive at the time given for your first appointment. This visit is used internally to schedule the physician's time during your ultrasound.            Feb 05, 2018  4:00 PM CST   ESTABLISHED PRENATAL with DORINDA Nevarez CNM   [facility] ([facility])    [address] [facility] [address] [facility] [address]  [facility] [address]   [phone]            Mar 05, 2018  4:00 PM CST   ESTABLISHED PRENATAL with DORINDA Nevarez CNM   [facility] ([facility])    [address] [facility]  [address]  [facility] [address]   [phone]            Apr 02, 2018  4:00 PM CDT   ESTABLISHED PRENATAL with DORINDA Nevarez CNM   [facility] ([facility])    [address] [facility] [address]  [facility] [address]   [phone]            Apr 30,  "2018  4:00 PM CDT   ESTABLISHED PRENATAL with DORINDA Nevarez CNM   [facility]    [address] [place] [address] [phone]              Who to contact     If you have questions or need follow up information about today's clinic visit or your schedule please contact [facility] directly at [phone].  Normal or non-critical lab and imaging results will be communicated to you by [employer], letter or phone within 4 business days after the clinic has received the results. If you do not hear from us within 7 days, please contact the clinic through [employer] or phone. If you have a critical or abnormal lab result, we will notify you by phone as soon as possible.  Submit refill requests through [employer] or call your pharmacy and they will forward the refill request to us. Please allow 3 business days for your refill to be completed.          Additional Information About Your Visit        [employer] Information     [employer] gives you secure access to your electronic health record. If you see a primary care provider, you can also send messages to your care team and make appointments. If you have questions, please call your primary care clinic.  If you do not have a primary care provider, please call [phone] and they will assist you.        Care EveryWhere ID     This is your Care EveryWhere ID. This could be used by other organizations to access your [place] medical records  [id number]        Your Vitals Were     Pulse Height Last Period BMI (Body Mass Index)          72 5' 8\" (1.727 m) 09/06/2017 22.2 kg/m2         Blood Pressure from Last 3 Encounters:   01/08/18 116/72   12/21/17 123/82   11/17/17 140/87    Weight from Last 3 Encounters:   01/08/18 146 lb (66.2 kg)   12/21/17 144 lb 9.6 oz (65.6 kg)   11/17/17 146 lb (66.2 kg)              We Performed the Following     Alpha fetoprotein maternal screen        Primary " Care Provider Office Phone # Fax #    Marychuy Murphy [phone]       [facility] [address] [facility] [address]        Equal Access to Services     GILL QUICK : Hadii aad ku hadadrianasatish Anam, wakielda luqadaha, qaybta kaalmada ladonna, jose thurstonjean-pierre thomasonbrii betancourtjuan josé maddox. So [facility] [phone].    ATENCIÓN: Si habla español, tiene a benitez disposición servicios gratuitos de asistencia lingüística. Llame al [phone].    We comply with applicable federal civil rights laws and [place] laws. We do not discriminate on the basis of race, color, national origin, age, disability, sex, sexual orientation, or gender identity.            Thank you!     Thank you for choosing [facility]  for your care. Our goal is always to provide you with excellent care. Hearing back from our patients is one way we can continue to improve our services. Please take a few minutes to complete the written survey that you may receive in the mail after your visit with us. Thank you!             Your Updated Medication List - Protect others around you: Learn how to safely use, store and throw away your medicines at www.disposemymeds.org.          This list is accurate as of: 1/8/18 11:59 PM.  Always use your most recent med list.                   Brand Name Dispense Instructions for use Diagnosis    Prenatal Vitamin 27-0.8 MG Tabs

## 2018-01-08 NOTE — PROGRESS NOTES
17w5d  Participated in shared wisdom session 1. Discussed SW and nutrition today. AFP drawn today. M ultrasound ordered and scheduled. Follow up at next shared wisdom. Flu vaccination done. Francesca Holt CNM

## 2018-01-10 LAB
# FETUSES US: NORMAL
AFP ADJ MOM AMN: 0.86
AFP SERPL-MCNC: 36 NG/ML
AGE - REPORTED: 36.4 YR
DATING METHOD: NORMAL
DIABETIC AT CONCEPTION: NO
FAMILY MEMBER DISEASES HX: NO
FAMILY MEMBER DISEASES HX: NO
GA METHOD: NORMAL
GA: 17.71 WEEKS
HX OF HEREDITARY DISORDERS: NO
IDDM PATIENT QL: NO
INTEGRATED SCN PATIENT-IMP: NORMAL
LMP START DATE: NORMAL
PREV HX CHROMOSOME ABNORMALITY: NO
SPECIMEN DRAWN SERPL: NORMAL
TWINS: NORMAL

## 2018-01-19 ENCOUNTER — OFFICE VISIT (OUTPATIENT)
Dept: MATERNAL FETAL MEDICINE | Facility: CLINIC | Age: 38
End: 2018-01-19
Attending: ADVANCED PRACTICE MIDWIFE
Payer: COMMERCIAL

## 2018-01-19 ENCOUNTER — HOSPITAL ENCOUNTER (OUTPATIENT)
Dept: ULTRASOUND IMAGING | Facility: CLINIC | Age: 38
Discharge: HOME | End: 2018-01-19
Attending: ADVANCED PRACTICE MIDWIFE | Admitting: OBSTETRICS & GYNECOLOGY
Payer: COMMERCIAL

## 2018-01-19 DIAGNOSIS — O09.512 AMA (ADVANCED MATERNAL AGE) PRIMIGRAVIDA 35+, SECOND TRIMESTER: Primary | ICD-10-CM

## 2018-01-19 DIAGNOSIS — O26.90 PREGNANCY RELATED CONDITION, ANTEPARTUM: ICD-10-CM

## 2018-01-19 PROCEDURE — 76811 OB US DETAILED SNGL FETUS: CPT

## 2018-01-19 NOTE — MR AVS SNAPSHOT
After Visit Summary   1/19/2018    Moon Ordoñez    MRN: [id number]           Patient Information     Date Of Birth          1980        Visit Information        Provider Department      1/19/2018 2:00 PM Milan Lorenzana MD [facility] Maternal Fetal Medicine [facility]        Today's Diagnoses     AMA (advanced maternal age) primigravida 35+, second trimester    -  1       Follow-ups after your visit        Your next 10 appointments already scheduled     Feb 05, 2018  4:00 PM CST   ESTABLISHED PRENATAL with DORINDA Nevarez CNM   [facility] ([facility])    [address] [place] [address] [address]  [facility] [address]   [phone]            Mar 05, 2018  4:00 PM CST   ESTABLISHED PRENATAL with DORINDA Nevarez CNM   [facility] ([facility])    [address] [place] [address]  [address]  [facility] [address]   [phone]            Apr 02, 2018  4:00 PM CDT   ESTABLISHED PRENATAL with DORINDA Nevarez CNM   [facility] ([facility])    [address] [place] [address]  [address]  [facility] [address] [phone]            Apr 30, 2018  4:00 PM CDT   ESTABLISHED PRENATAL with DORINDA Nevarez CNM   [facility] ([facility])    [address] [place] [address] 700  [facility] [address] [phone]              Who to contact     If you have questions or need follow up information about today's clinic visit or your schedule please contact [facility] directly at [phone].  Normal or non-critical lab and imaging results will be communicated to you by MyChart, letter or phone within 4 business days after the clinic has received the results. If you do not hear from us within 7 days, please contact the clinic through MyChart or phone. If you have a critical or abnormal lab result, we will notify you by  phone as soon as possible.  Submit refill requests through [employer] or call your pharmacy and they will forward the refill request to us. Please allow 3 business days for your refill to be completed.          Additional Information About Your Visit        [facility] [employer] Information     [employer] gives you secure access to your electronic health record. If you see a primary care provider, you can also send messages to your care team and make appointments. If you have questions, please call your primary care clinic.  If you do not have a primary care provider, please call [phone] and they will assist you.        Care EveryWhere ID     This is your Care EveryWhere ID. This could be used by other organizations to access your [place] medical records  [id number]        Your Vitals Were     Last Period                   09/06/2017            Blood Pressure from Last 3 Encounters:   01/08/18 116/72   12/21/17 123/82   11/17/17 140/87    Weight from Last 3 Encounters:   01/08/18 66.2 kg (146 lb)   12/21/17 65.6 kg (144 lb 9.6 oz)   11/17/17 66.2 kg (146 lb)              Today, you had the following     No orders found for display       Primary Care Provider Office Phone # Fax #    Marychuy Murphy [phone]       [facility] [address] [facility] [address]        Equal Access to Services     GILL QUICK AH: Hadii noe padron hadasho Soomaali, waaxda luqadaha, qaybta kaalmada adeegyada, jose shen . So [facility] [phone].    ATENCIÓN: Si habla español, tiene a benitez disposición servicios gratuitos de asistencia lingüística. Nae al [phone].    We comply with applicable federal civil rights laws and [place] laws. We do not discriminate on the basis of race, color, national origin, age, disability, sex, sexual orientation, or gender identity.            Thank you!     Thank you for choosing [facility]  for your care. Our goal is  always to provide you with excellent care. Hearing back from our patients is one way we can continue to improve our services. Please take a few minutes to complete the written survey that you may receive in the mail after your visit with us. Thank you!             Your Updated Medication List - Protect others around you: Learn how to safely use, store and throw away your medicines at www.disposemymeds.org.          This list is accurate as of: 1/19/18  2:43 PM.  Always use your most recent med list.                   Brand Name Dispense Instructions for use Diagnosis    Prenatal Vitamin 27-0.8 MG Tabs

## 2018-01-19 NOTE — PROGRESS NOTES
"Please see \"Imaging\" tab under \"Chart Review\" for details of today's US at the [facility].    Milan Lorenzana MD  Maternal-Fetal Medicine      "

## 2018-01-22 PROBLEM — O09.529 AMA (ADVANCED MATERNAL AGE) MULTIGRAVIDA 35+: Status: ACTIVE | Noted: 2017-11-17

## 2018-02-05 ENCOUNTER — PRENATAL OFFICE VISIT (OUTPATIENT)
Dept: MIDWIFE SERVICES | Facility: CLINIC | Age: 38
End: 2018-02-05
Payer: COMMERCIAL

## 2018-02-05 VITALS
HEART RATE: 71 BPM | WEIGHT: 150.8 LBS | BODY MASS INDEX: 22.93 KG/M2 | DIASTOLIC BLOOD PRESSURE: 76 MMHG | SYSTOLIC BLOOD PRESSURE: 122 MMHG

## 2018-02-05 DIAGNOSIS — O09.522 ELDERLY MULTIGRAVIDA IN SECOND TRIMESTER: Primary | ICD-10-CM

## 2018-02-05 PROCEDURE — 99207 ZZC PRENATAL VISIT: CPT | Performed by: ADVANCED PRACTICE MIDWIFE

## 2018-02-05 NOTE — MR AVS SNAPSHOT
After Visit Summary   2/5/2018    Moon Ordoñez    MRN: [id number]           Patient Information     Date Of Birth          1980        Visit Information        Provider Department      2/5/2018 4:00 PM Francesca Holt APRN CNM [facility]        Today's Diagnoses     Elderly multigravida in second trimester    -  1       Follow-ups after your visit        Your next 10 appointments already scheduled     Mar 05, 2018  4:00 PM CST   ESTABLISHED PRENATAL with DORINDA Nevarez CNM   [facility] ([facility])    [address] [place] [address] 700  [facility] [address]   [phone]            Apr 02, 2018  4:00 PM CDT   ESTABLISHED PRENATAL with DORINDA Nevarez CNM   [facility] ([facility])    [address] [place] [address] 700  [facility] [address]   [phone]            Apr 30, 2018  4:00 PM CDT   ESTABLISHED PRENATAL with DORINDA Nevarez CNM   [facility] ([facility])    [address] [place] [address]  [address]  [facility] [address]   [phone]            May 14, 2018  4:00 PM CDT   ESTABLISHED PRENATAL with DORINDA Nevarez CNM   [facility] ([facility])    [address] [place] [address] 700  [facility] [address]   [phone]            May 29, 2018  4:00 PM CDT   ESTABLISHED PRENATAL with DORINDA Nevarez CNM   [facility] ([facility])    [address] [place] [address]  [address]  [facility] [address]   [phone]            Jun 11, 2018  4:00 PM CDT   ESTABLISHED PRENATAL with DORINDA Nevarez CNM   [facility] ([facility])    [address] [place] [address] 700  [facility] [address]   [phone]              Future tests that were ordered for you today     Open Future Orders        Priority Expected Expires  Ordered    Glucose tolerance gest screen 1 hour Routine  2/5/2019 2/5/2018    OB hemoglobin Routine  2/5/2019 2/5/2018            Who to contact     If you have questions or need follow up information about today's clinic visit or your schedule please contact [facility] directly at [phone].  Normal or non-critical lab and imaging results will be communicated to you by MyChart, letter or phone within 4 business days after the clinic has received the results. If you do not hear from us within 7 days, please contact the clinic through [employer] or phone. If you have a critical or abnormal lab result, we will notify you by phone as soon as possible.  Submit refill requests through [employer] or call your pharmacy and they will forward the refill request to us. Please allow 3 business days for your refill to be completed.          Additional Information About Your Visit        MyChart Information     [employer] gives you secure access to your electronic health record. If you see a primary care provider, you can also send messages to your care team and make appointments. If you have questions, please call your primary care clinic.  If you do not have a primary care provider, please call [phone] and they will assist you.        Care EveryWhere ID     This is your Care EveryWhere ID. This could be used by other organizations to access your [place] medical records  [id number]        Your Vitals Were     Pulse Last Period BMI (Body Mass Index)             71 09/06/2017 22.93 kg/m2          Blood Pressure from Last 3 Encounters:   02/05/18 122/76   01/08/18 116/72   12/21/17 123/82    Weight from Last 3 Encounters:   02/05/18 150 lb 12.8 oz (68.4 kg)   01/08/18 146 lb (66.2 kg)   12/21/17 144 lb 9.6 oz (65.6 kg)               Primary Care Provider Office Phone # Fax #    Marychuy Murphy [phone]       [facility] [address] [facility] [address]        Equal Access  to Services     GILL QUICK : Shelia Mendieta, ashanti velasquez, jose kendall. So [facility] [phone].    ATENCIÓN: Si habla español, tiene a benitez disposición servicios gratuitos de asistencia lingüística. Llame al [phone].    We comply with applicable federal civil rights laws and [place] laws. We do not discriminate on the basis of race, color, national origin, age, disability, sex, sexual orientation, or gender identity.            Thank you!     Thank you for choosing [facility]  for your care. Our goal is always to provide you with excellent care. Hearing back from our patients is one way we can continue to improve our services. Please take a few minutes to complete the written survey that you may receive in the mail after your visit with us. Thank you!             Your Updated Medication List - Protect others around you: Learn how to safely use, store and throw away your medicines at www.disposemymeds.org.          This list is accurate as of 2/5/18 11:59 PM.  Always use your most recent med list.                   Brand Name Dispense Instructions for use Diagnosis    Prenatal Vitamin 27-0.8 MG Tabs

## 2018-02-07 NOTE — PROGRESS NOTES
21w5d  Participated in shared wisdom session 2,  and common discomforts of pregnancy. Patient had ultrasound with MFM, no follow up needed. She plans the GCT and HGB at next visit with shared wisdom. No other concerns today. Francesca Holt CNM

## 2018-03-05 ENCOUNTER — PRENATAL OFFICE VISIT (OUTPATIENT)
Dept: MIDWIFE SERVICES | Facility: CLINIC | Age: 38
End: 2018-03-05
Payer: COMMERCIAL

## 2018-03-05 VITALS
HEIGHT: 68 IN | HEART RATE: 80 BPM | WEIGHT: 157.8 LBS | DIASTOLIC BLOOD PRESSURE: 82 MMHG | SYSTOLIC BLOOD PRESSURE: 120 MMHG | BODY MASS INDEX: 23.92 KG/M2

## 2018-03-05 DIAGNOSIS — Z34.02 ENCOUNTER FOR SUPERVISION OF NORMAL FIRST PREGNANCY IN SECOND TRIMESTER: Primary | ICD-10-CM

## 2018-03-05 DIAGNOSIS — O09.522 ELDERLY MULTIGRAVIDA IN SECOND TRIMESTER: ICD-10-CM

## 2018-03-05 LAB
GLUCOSE 1H P 50 G GLC PO SERPL-MCNC: 149 MG/DL (ref 60–129)
HGB BLD-MCNC: 11.7 G/DL (ref 11.7–15.7)

## 2018-03-05 PROCEDURE — 36415 COLL VENOUS BLD VENIPUNCTURE: CPT | Performed by: ADVANCED PRACTICE MIDWIFE

## 2018-03-05 PROCEDURE — 99207 ZZC PRENATAL VISIT: CPT | Performed by: ADVANCED PRACTICE MIDWIFE

## 2018-03-05 PROCEDURE — 82950 GLUCOSE TEST: CPT | Performed by: ADVANCED PRACTICE MIDWIFE

## 2018-03-05 PROCEDURE — 00000218 ZZHCL STATISTIC OBHBG - HEMOGLOBIN: Performed by: ADVANCED PRACTICE MIDWIFE

## 2018-03-05 NOTE — PROGRESS NOTES
25w5d   Participated in shared wisdom session #3, reviewed labor, interventions, and birth planning. Follow up at next shared wisdom. GCT and HGB today. Francesca Holt CNM

## 2018-03-05 NOTE — MR AVS SNAPSHOT
After Visit Summary   3/5/2018    Moon Ordoñez    MRN: [id number]           Patient Information     Date Of Birth          1980        Visit Information        Provider Department      3/5/2018 4:00 PM Francesca Holt APRN CNM [facility]        Today's Diagnoses     Encounter for supervision of normal first pregnancy in second trimester    -  1    Elderly multigravida in second trimester           Follow-ups after your visit        Your next 10 appointments already scheduled     Mar 06, 2018  8:00 AM CST   LAB with RD LAB   [facility]    [address] [place] [address]   [phone]           Please do not eat 10-12 hours before your appointment if you are coming in fasting for labs on lipids, cholesterol, or glucose (sugar). This does not apply to pregnant women. Water, hot tea and black coffee (with nothing added) are okay. Do not drink other fluids, diet soda or chew gum.            Apr 02, 2018  4:00 PM CDT   ESTABLISHED PRENATAL with DORINDA Nevarez CNM   [facility] ([facility])    [address] [place] [address] [address]  [facility] [address]   [phone]            Apr 30, 2018  4:00 PM CDT   ESTABLISHED PRENATAL with DORINDA Santana CNM   [facility] ([facility])    [address] [place] [address] [address]  [facility] [address]   [phone]            May 14, 2018  4:00 PM CDT   ESTABLISHED PRENATAL with DORINDA Lee CNM   [facility] ([facility])    [address] [place] [address] [address]  [facility] [address]   [phone]            May 29, 2018  4:00 PM CDT   ESTABLISHED PRENATAL with DORINDA Nevarez CNM   [facility] ([facility])    [address] [place] [address] [address]  [facility] [address]   [phone]            Chirag  "11, 2018  4:00 PM CDT   ESTABLISHED PRENATAL with DORINDA Nevarez CNM   [facility])    [address] [place] [address] [phone]              Future tests that were ordered for you today     Open Future Orders        Priority Expected Expires Ordered    Glucose tolerance, gest std 100 gm, 3 hr Routine  3/5/2019 3/5/2018            Who to contact     If you have questions or need follow up information about today's clinic visit or your schedule please contact [facility] directly at [phone].  Normal or non-critical lab and imaging results will be communicated to you by MyChart, letter or phone within 4 business days after the clinic has received the results. If you do not hear from us within 7 days, please contact the clinic through [employer] or phone. If you have a critical or abnormal lab result, we will notify you by phone as soon as possible.  Submit refill requests through [employer] or call your pharmacy and they will forward the refill request to us. Please allow 3 business days for your refill to be completed.          Additional Information About Your Visit        [employer] Information     [employer] gives you secure access to your electronic health record. If you see a primary care provider, you can also send messages to your care team and make appointments. If you have questions, please call your primary care clinic.  If you do not have a primary care provider, please call [phone] and they will assist you.        Care EveryWhere ID     This is your Care EveryWhere ID. This could be used by other organizations to access your [place] medical records  [id number]        Your Vitals Were     Pulse Height Last Period BMI (Body Mass Index)          80 5' 8\" (1.727 m) 09/06/2017 23.99 kg/m2         Blood Pressure from Last 3 Encounters:   03/05/18 120/82   02/05/18 122/76   01/08/18 116/72    Weight from Last 3 " Encounters:   03/05/18 157 lb 12.8 oz (71.6 kg)   02/05/18 150 lb 12.8 oz (68.4 kg)   01/08/18 146 lb (66.2 kg)              We Performed the Following     Glucose tolerance gest screen 1 hour     OB hemoglobin        Primary Care Provider Office Phone # Fax #    Marychuy Murphy [phone]       [facility] [address] [facility] [address]        Equal Access to Services     GILL QUICK : Hadii aad ku hadasho Soomaali, waaxda luqadaha, qaybta kaalmada adeegyada, waxay idiin hayaan adeeg karol laanna marie . So [facility] [phone].    ATENCIÓN: Si habla español, tiene a benitez disposición servicios gratuitos de asistencia lingüística. [facility] [phone].    We comply with applicable federal civil rights laws and [place] laws. We do not discriminate on the basis of race, color, national origin, age, disability, sex, sexual orientation, or gender identity.            Thank you!     Thank you for choosing [facility]  for your care. Our goal is always to provide you with excellent care. Hearing back from our patients is one way we can continue to improve our services. Please take a few minutes to complete the written survey that you may receive in the mail after your visit with us. Thank you!             Your Updated Medication List - Protect others around you: Learn how to safely use, store and throw away your medicines at www.disposemymeds.org.          This list is accurate as of 3/5/18  6:11 PM.  Always use your most recent med list.                   Brand Name Dispense Instructions for use Diagnosis    Prenatal Vitamin 27-0.8 MG Tabs

## 2018-03-12 DIAGNOSIS — Z34.02 ENCOUNTER FOR SUPERVISION OF NORMAL FIRST PREGNANCY IN SECOND TRIMESTER: ICD-10-CM

## 2018-03-12 LAB
GLUCOSE 1H P 100 G GLC PO SERPL-MCNC: 138 MG/DL (ref 60–179)
GLUCOSE 2H P 100 G GLC PO SERPL-MCNC: 100 MG/DL (ref 60–154)
GLUCOSE 3H P 100 G GLC PO SERPL-MCNC: 96 MG/DL (ref 60–139)
GLUCOSE P FAST SERPL-MCNC: 82 MG/DL (ref 60–94)

## 2018-03-12 PROCEDURE — 36415 COLL VENOUS BLD VENIPUNCTURE: CPT | Performed by: ADVANCED PRACTICE MIDWIFE

## 2018-03-12 PROCEDURE — 82952 GTT-ADDED SAMPLES: CPT | Performed by: ADVANCED PRACTICE MIDWIFE

## 2018-03-12 PROCEDURE — 82951 GLUCOSE TOLERANCE TEST (GTT): CPT | Performed by: ADVANCED PRACTICE MIDWIFE

## 2018-04-02 ENCOUNTER — PRENATAL OFFICE VISIT (OUTPATIENT)
Dept: MIDWIFE SERVICES | Facility: CLINIC | Age: 38
End: 2018-04-02
Payer: COMMERCIAL

## 2018-04-02 VITALS
SYSTOLIC BLOOD PRESSURE: 133 MMHG | HEART RATE: 87 BPM | DIASTOLIC BLOOD PRESSURE: 81 MMHG | WEIGHT: 163 LBS | BODY MASS INDEX: 24.78 KG/M2

## 2018-04-02 DIAGNOSIS — Z34.83 ENCOUNTER FOR SUPERVISION OF OTHER NORMAL PREGNANCY IN THIRD TRIMESTER: Primary | ICD-10-CM

## 2018-04-02 DIAGNOSIS — Z23 NEED FOR TDAP VACCINATION: ICD-10-CM

## 2018-04-02 PROCEDURE — 90715 TDAP VACCINE 7 YRS/> IM: CPT | Performed by: ADVANCED PRACTICE MIDWIFE

## 2018-04-02 PROCEDURE — 90471 IMMUNIZATION ADMIN: CPT | Performed by: ADVANCED PRACTICE MIDWIFE

## 2018-04-02 PROCEDURE — 99207 ZZC PRENATAL VISIT: CPT | Performed by: ADVANCED PRACTICE MIDWIFE

## 2018-04-02 NOTE — MR AVS SNAPSHOT
After Visit Summary   4/2/2018    Moon Ordoñez    MRN: [id number]           Patient Information     Date Of Birth          1980        Visit Information        Provider Department      4/2/2018 4:00 PM Francesca Holt APRN CNM [facility]        Today's Diagnoses     Encounter for supervision of other normal pregnancy in third trimester    -  1    Need for Tdap vaccination           Follow-ups after your visit        Your next 10 appointments already scheduled     Apr 30, 2018  4:00 PM CDT   ESTABLISHED PRENATAL with DORINDA Santana CNM   [facility] ([facility])    [address] [place] [address] [address]  [facility] [address]   [phone]            May 14, 2018  4:00 PM CDT   ESTABLISHED PRENATAL with DORINDA Lee CNM   [facility] ([facility])    [address] [place] [address] 700  [facility] [address] [phone]            May 29, 2018  4:00 PM CDT   ESTABLISHED PRENATAL with DORINDA Nevarez CNM   [facility] ([facility])    [address] [place] [address]  [address]  [facility] [address] [phone]            Jun 11, 2018  4:00 PM CDT   ESTABLISHED PRENATAL with DORINDA Nevarez CNM   [facility] ([facility])    [address] [place] [address] 700  [facility] [address] [phone]              Who to contact     If you have questions or need follow up information about today's clinic visit or your schedule please contact [facility] directly at [phone].  Normal or non-critical lab and imaging results will be communicated to you by MyChart, letter or phone within 4 business days after the clinic has received the results. If you do not hear from us within 7 days, please contact the clinic through MyChart or phone. If you have a critical or abnormal lab result, we will notify  you by phone as soon as possible.  Submit refill requests through [employer] or call your pharmacy and they will forward the refill request to us. Please allow 3 business days for your refill to be completed.          Additional Information About Your Visit        [employer] Information     [employer] gives you secure access to your electronic health record. If you see a primary care provider, you can also send messages to your care team and make appointments. If you have questions, please call your primary care clinic.  If you do not have a primary care provider, please call [phone] and they will assist you.        Care EveryWhere ID     This is your Care EveryWhere ID. This could be used by other organizations to access your [facility] medical records  [id number]        Your Vitals Were     Pulse Last Period BMI (Body Mass Index)             87 09/06/2017 24.78 kg/m2          Blood Pressure from Last 3 Encounters:   04/02/18 133/81   03/05/18 120/82   02/05/18 122/76    Weight from Last 3 Encounters:   04/02/18 163 lb (73.9 kg)   03/05/18 157 lb 12.8 oz (71.6 kg)   02/05/18 150 lb 12.8 oz (68.4 kg)              We Performed the Following     TDAP VACCINE (BOOSTRIX)     VACCINE ADMINISTRATION, INITIAL        Primary Care Provider Office Phone # Fax #    Marychuy Murphy [phone]       [facility] [address] [facility] [address]        Equal Access to Services     [facility]: Hadii aad ku hadasho Sojose eduardoali, waaxda luqadaha, qaybta kaalmada lillyyada, jose shen . So [facility] [phone].    ATENCIÓN: Si habla español, tiene a benitez disposición servicios gratuitos de asistencia lingüística. Llame al [phone].    We comply with applicable federal civil rights laws and [place] laws. We do not discriminate on the basis of race, color, national origin, age, disability, sex, sexual orientation, or gender identity.            Thank you!     Thank you for  choosing [facility]  for your care. Our goal is always to provide you with excellent care. Hearing back from our patients is one way we can continue to improve our services. Please take a few minutes to complete the written survey that you may receive in the mail after your visit with us. Thank you!             Your Updated Medication List - Protect others around you: Learn how to safely use, store and throw away your medicines at www.disposemymeds.org.          This list is accurate as of 4/2/18  7:01 PM.  Always use your most recent med list.                   Brand Name Dispense Instructions for use Diagnosis    Prenatal Vitamin 27-0.8 MG Tabs

## 2018-04-02 NOTE — PROGRESS NOTES
29w5d  Participated in shared wisdom session #4 breastfeeding and parenting. Follow up at 32 weeks. Tdap vaccination today. Francesca Holt CNM

## 2018-04-04 ENCOUNTER — TELEPHONE (OUTPATIENT)
Dept: MIDWIFE SERVICES | Facility: CLINIC | Age: 38
End: 2018-04-04

## 2018-04-04 NOTE — TELEPHONE ENCOUNTER
TC to the pt to find out what she would like done with her FMLA? Does she want it faxed, emailed or will she pick it up? LMTC. Paperwork in the Triage To Do black bin. Tahmina Hardy RN

## 2018-04-19 ENCOUNTER — PRENATAL OFFICE VISIT (OUTPATIENT)
Dept: MIDWIFE SERVICES | Facility: CLINIC | Age: 38
End: 2018-04-19
Payer: COMMERCIAL

## 2018-04-19 VITALS
TEMPERATURE: 96.6 F | HEART RATE: 83 BPM | BODY MASS INDEX: 24.78 KG/M2 | DIASTOLIC BLOOD PRESSURE: 80 MMHG | WEIGHT: 163 LBS | SYSTOLIC BLOOD PRESSURE: 120 MMHG

## 2018-04-19 DIAGNOSIS — Z34.83 ENCOUNTER FOR SUPERVISION OF OTHER NORMAL PREGNANCY IN THIRD TRIMESTER: Primary | ICD-10-CM

## 2018-04-19 PROCEDURE — 99207 ZZC PRENATAL VISIT: CPT | Performed by: ADVANCED PRACTICE MIDWIFE

## 2018-04-19 NOTE — PROGRESS NOTES
32w1d  Patient feeling well. Positive fetal movement. Denies water leaking, vaginal bleeding, decreased fetal movement, contraction pain, or headaches.   Doing well. No concerns today. Good questions about what to expect around this time in pregnancy.   Danger signs reviewed, pre-eclampsia signs and symptoms discussed.   Knows when to call triage and has phone numbers.   Follow up in 2 weeks for next shared wisdom group.   Francesca Holt CNM

## 2018-04-19 NOTE — MR AVS SNAPSHOT
After Visit Summary   4/19/2018    Moon Ordoñez    MRN: [id number]           Patient Information     Date Of Birth          1980        Visit Information        Provider Department      4/19/2018 8:30 AM Francesca Holt APRN CNM [facility]        Today's Diagnoses     Encounter for supervision of other normal pregnancy in third trimester    -  1       Follow-ups after your visit        Your next 10 appointments already scheduled     Apr 30, 2018  4:00 PM CDT   ESTABLISHED PRENATAL with DORINDA Santana CNM   [facility] ([facility])    [address] [place] [address] 700  [facility] [address]   [phone]            May 14, 2018  4:00 PM CDT   ESTABLISHED PRENATAL with DORINDA Lee CNM   [facility] ([facility])    [address] [place] [address] 700  [facility] [address] [phone]            May 29, 2018  4:00 PM CDT   ESTABLISHED PRENATAL with DORINDA Nevarez CNM   [facility] ([facility])    [address] [place] [address] 700  [facility] [address] [phone]            Jun 11, 2018  4:00 PM CDT   ESTABLISHED PRENATAL with DORINDA Nevarez CNM   [facility] ([facility])    [address] [place] [address] 700  [facility] [address] [phone]              Who to contact     If you have questions or need follow up information about today's clinic visit or your schedule please contact [facility] directly at [phone].  Normal or non-critical lab and imaging results will be communicated to you by MyChart, letter or phone within 4 business days after the clinic has received the results. If you do not hear from us within 7 days, please contact the clinic through MyChart or phone. If you have a critical or abnormal lab result, we will notify you by phone as soon as  possible.  Submit refill requests through [employer] or call your pharmacy and they will forward the refill request to us. Please allow 3 business days for your refill to be completed.          Additional Information About Your Visit        [employer] Information     [employer] gives you secure access to your electronic health record. If you see a primary care provider, you can also send messages to your care team and make appointments. If you have questions, please call your primary care clinic.  If you do not have a primary care provider, please call [phone] and they will assist you.        Care EveryWhere ID     This is your Care EveryWhere ID. This could be used by other organizations to access your [facility] medical records  [id number]        Your Vitals Were     Pulse Temperature Last Period BMI (Body Mass Index)          83 96.6  F (35.9  C) (Oral) 09/06/2017 24.78 kg/m2         Blood Pressure from Last 3 Encounters:   04/19/18 120/80   04/02/18 133/81   03/05/18 120/82    Weight from Last 3 Encounters:   04/19/18 163 lb (73.9 kg)   04/02/18 163 lb (73.9 kg)   03/05/18 157 lb 12.8 oz (71.6 kg)              Today, you had the following     No orders found for display       Primary Care Provider Office Phone # Fax #    Marychuy Murphy [phone]       [facility] [address] [facility] [address]        Equal Access to Services     [facility] : Hadii noe padron hadasho Sojose eduardoali, waaxda luqadaha, qaybta kaalmada ladonna, jose shen . So [facility] [phone].    ATENCIÓN: Si habla español, tiene a benitez disposición servicios gratuitos de asistencia lingüística. Llame al [phone].    We comply with applicable federal civil rights laws and [place] laws. We do not discriminate on the basis of race, color, national origin, age, disability, sex, sexual orientation, or gender identity.            Thank you!     Thank you for choosing [facility]  for your care. Our goal is always to provide you with excellent care. Hearing back from our patients is one way we can continue to improve our services. Please take a few minutes to complete the written survey that you may receive in the mail after your visit with us. Thank you!             Your Updated Medication List - Protect others around you: Learn how to safely use, store and throw away your medicines at www.disposemymeds.org.          This list is accurate as of 4/19/18  8:59 AM.  Always use your most recent med list.                   Brand Name Dispense Instructions for use Diagnosis    Prenatal Vitamin 27-0.8 MG Tabs

## 2018-04-30 ENCOUNTER — PRENATAL OFFICE VISIT (OUTPATIENT)
Dept: MIDWIFE SERVICES | Facility: CLINIC | Age: 38
End: 2018-04-30
Payer: COMMERCIAL

## 2018-04-30 VITALS
HEIGHT: 68 IN | WEIGHT: 166.6 LBS | BODY MASS INDEX: 25.25 KG/M2 | SYSTOLIC BLOOD PRESSURE: 132 MMHG | DIASTOLIC BLOOD PRESSURE: 87 MMHG | HEART RATE: 78 BPM

## 2018-04-30 DIAGNOSIS — Z34.83 ENCOUNTER FOR SUPERVISION OF OTHER NORMAL PREGNANCY IN THIRD TRIMESTER: Primary | ICD-10-CM

## 2018-04-30 PROCEDURE — 99207 ZZC PRENATAL VISIT: CPT | Performed by: ADVANCED PRACTICE MIDWIFE

## 2018-04-30 NOTE — PROGRESS NOTES
Feeling well, no concerns. Participated in Shared [place] session 6 today - Birthplace tour. BSUS to confirm vertex presentation due to feeling hiccups high in uterus. RTC in 2 weeks for next SW group. MML

## 2018-04-30 NOTE — MR AVS SNAPSHOT
After Visit Summary   4/30/2018    Moon Ordoñez    MRN: [id number]           Patient Information     Date Of Birth          1980        Visit Information        Provider Department      4/30/2018 4:00 PM Garima Bains APRN CNM [facility]        Today's Diagnoses     Encounter for supervision of other normal pregnancy in third trimester    -  1       Follow-ups after your visit        Your next 10 appointments already scheduled     May 14, 2018  4:00 PM CDT   ESTABLISHED PRENATAL with DORINDA Lee CNM   [facility] ([facility])    [address] [place] [address] 700  [facility] [address]   [phone]            May 29, 2018  4:00 PM CDT   ESTABLISHED PRENATAL with DORINDA Nevarez CNM   [facility] ([facility])    [address] [place] [address] 700  [facility] [address] [phone]            Jun 11, 2018  4:00 PM CDT   ESTABLISHED PRENATAL with DORINDA Nevarez CNM   [facility] ([facility])    [address] [place] [address] [phone]              Who to contact     If you have questions or need follow up information about today's clinic visit or your schedule please contact [facility] directly at [phone].  Normal or non-critical lab and imaging results will be communicated to you by MyChart, letter or phone within 4 business days after the clinic has received the results. If you do not hear from us within 7 days, please contact the clinic through MyChart or phone. If you have a critical or abnormal lab result, we will notify you by phone as soon as possible.  Submit refill requests through [employer] or call your pharmacy and they will forward the refill request to us. Please allow 3 business days for your refill to be completed.          Additional Information About Your Visit       "  MyChart Information     [employer] gives you secure access to your electronic health record. If you see a primary care provider, you can also send messages to your care team and make appointments. If you have questions, please call your primary care clinic.  If you do not have a primary care provider, please call [phone] and they will assist you.        Care EveryWhere ID     This is your Care EveryWhere ID. This could be used by other organizations to access your [facility] medical records  [id number]        Your Vitals Were     Pulse Height Last Period BMI (Body Mass Index)          78 5' 8\" (1.727 m) 09/06/2017 25.33 kg/m2         Blood Pressure from Last 3 Encounters:   04/30/18 132/87   04/19/18 120/80   04/02/18 133/81    Weight from Last 3 Encounters:   04/30/18 166 lb 9.6 oz (75.6 kg)   04/19/18 163 lb (73.9 kg)   04/02/18 163 lb (73.9 kg)              Today, you had the following     No orders found for display       Primary Care Provider Office Phone # Fax #    Marychuy Murphy [phone]       [facility] [address] [facility] [address]        Equal Access to Services     [facility]: Hadii noe ku hadasho Soomaali, waaxda luqadaha, qaybta kaalmada adeegyada, jose narayanin hayjaylan lilly maddox. So [facility] [phone].    ATENCIÓN: Si habla español, tiene a benitez disposición servicios gratuitos de asistencia lingüística. Llame al [phone].    We comply with applicable federal civil rights laws and [place] laws. We do not discriminate on the basis of race, color, national origin, age, disability, sex, sexual orientation, or gender identity.            Thank you!     Thank you for choosing [facility]  for your care. Our goal is always to provide you with excellent care. Hearing back from our patients is one way we can continue to improve our services. Please take a few minutes to complete the written survey that you may receive in the mail " after your visit with us. Thank you!             Your Updated Medication List - Protect others around you: Learn how to safely use, store and throw away your medicines at www.disposemymeds.org.          This list is accurate as of 4/30/18  6:21 PM.  Always use your most recent med list.                   Brand Name Dispense Instructions for use Diagnosis    Prenatal Vitamin 27-0.8 MG Tabs

## 2018-05-14 ENCOUNTER — PRENATAL OFFICE VISIT (OUTPATIENT)
Dept: MIDWIFE SERVICES | Facility: CLINIC | Age: 38
End: 2018-05-14
Payer: COMMERCIAL

## 2018-05-14 VITALS
HEIGHT: 68 IN | WEIGHT: 169.2 LBS | DIASTOLIC BLOOD PRESSURE: 85 MMHG | HEART RATE: 85 BPM | SYSTOLIC BLOOD PRESSURE: 132 MMHG | BODY MASS INDEX: 25.64 KG/M2

## 2018-05-14 DIAGNOSIS — Z34.03 ENCOUNTER FOR SUPERVISION OF NORMAL FIRST PREGNANCY IN THIRD TRIMESTER: Primary | ICD-10-CM

## 2018-05-14 PROCEDURE — 99207 ZZC PRENATAL VISIT: CPT | Performed by: ADVANCED PRACTICE MIDWIFE

## 2018-05-14 NOTE — PATIENT INSTRUCTIONS
End of Pregnancy Information    We are so pleased you have chosen us to help you with your birth.  Our goal is to help support a safe, meaningful and rewarding birth experience for you and your family.  The following information may be helpful as you near the end of your pregnancy.  If you would [facility] to [facility] please call [phone].    LABOR INSTRUCTIONS  If you would like to try and avoid the use of pain medications for your labor, one of the best things you can do is to stay home as long as your and your baby s condition allow it, especially during the early part of your labor.  The Midwife on call can help guide you through this part if you are unsure, call us at [phone] any time, day or night.  Part of our assessment will be in speaking with you directly on the phone.  How do I know if I am in active labor?   Active labor will be regular, painful contractions that are lasting a minute or more and coming every 5 minutes for more than an hour in a row.  The intensity, or how much they hurt is more important that how frequently or often the contractions are coming.  You could try and take a hot shower or tub bath, if the contractions continue to increase in intensity or frequency, it can be a sign of active labor.  If you feel like you could lay down and doze between contractions, then do that, doze, let your contraction come and know that your body is working towards having your baby and then relax and rest again.  You could try having a light snack, like fruit, cereal or toast and continue to drink fluids.    Reasons to call the On Call Midwife @ [phone]  o If you have vaginal bleeding like a heavy period  o If you think your water broke  o If you think your baby has not been moving enough  o If you think you are in labor  o If you are worried or concerned about yourself or your baby      PAIN MEDICATIONS IN LABOR  Should I use pain medications in  labor?  This decision to use pain medication or not during labor is for each woman to decide.  Some women have very strong feelings one way or the other, others would like to wait and see how it goes.  My personal recommendation would be to be flexible.  If someone is laboring and coping well with the process, they are making progress and do not want pain medication then they don t need it.  We have bathtubs, birthing balls, labor slings, rocking chairs and a labor position menu to support laboring without intervention and/or pain medication.  However, if someone has been awake with early labor for several nights, is having a hard time coping with the process, or wants pain medication, then pain medication can be a helpful tool to have.  What types of pain medications are available to me during my labor?    Nitrous Oxide  Nitrous Oxide is a quick acting gas that you breathe in through a mask.  It is used all over the world for labor pain relief.  Many dentists use it too.  We call it  nitrous  for short, other people may call it laughing gas.  We have a handout with more details about using Nitrous Oxide during labor.    IV narcotic/Relaxer medications  Narcotic pain medications are medicines that go into your IV or as a shot into your muscle.  They help to  take the edge off the pain .  Thy do not, in general remove the pain.  They can make you feel kind of sleepy and help you relax better, especially in the earlier parts of labor.  Most of the time they last a short time (1-4 hours).  These medicines do  get to  your baby.  So your baby may get sleepy too.  This does not hurt your baby.  We do not give these medications if we think you will give birth to your baby soon.  This way your body will get rid of the medicine before your baby is born.    Epidural  Epidural is the most common pain medicine used by women in labor across the [place].  It is given to you by a special doctor called an Anesthesiologist.   An epidural is a tiny, soft tube that is placed into your lower back that can give you pain medicine until after your baby is born.  Most often, a labor epidural provides the most complete labor pain relief that we have available.  A labor epidural is placed by a special doctor call an Anesthesiologist.  In order to have one placed you need to have an IV, and then sit on the side of the bed in kind of a slouched position curling around your baby.  After an epidural is placed you will need to stay in bed, we will help you change positions from side to side and sitting up to help your labor continue to progress.  We will also empty your bladder with a catheter about every three hours.    What Should I Bring to the Hospital?    A picture ID    Your insurance card    Your birth plan if you have one    Eyeglasses and/or contacts    Toiletries: Toothbrush and toothpaste, lip balm (it can be very dry at the hospital) lotion, deodorant, a brush or comb, hairband or barrettes    A bathrobe if you like to wear one    Warm sock or slippers    A camera and     Cell phone and cell phone     A comfy nursing bra    A soft blanket for baby    An infant car seat    A going home outfit for you, soft and comfortable, something you may have worn when you were about 5 months pregnant    A going home outfit and hat for your baby    Snacks for your support people    Some optional treats for you and your partner:  o A pillow from home  o Flameless candles  o A music player  o Treats to eat in postpartum like granola bars, cookies and fruit  o A breastfeeding pillow - especially if you intend to use one when you go home    * * * *   We are looking forward to working with you during this special time in your life.  If you have questions or have something we can do to help you, please don't hesitate to ask at one of your appointments, on mSpoke or calling [phone].    Kitty Lau

## 2018-05-14 NOTE — MR AVS SNAPSHOT
After Visit Summary   5/14/2018    Moon Ordoñez    MRN: [id number]           Patient Information     Date Of Birth          1980        Visit Information        Provider Department      5/14/2018 4:00 PM Kitty Lau APRN [place] [facility]        Care Instructions    End of Pregnancy Information    We are so pleased you have chosen us to help you with your birth.  Our goal is to help support a safe, meaningful and rewarding birth experience for you and your family.  The following information may be helpful as you near the end of your pregnancy.  If you would like to schedule a tour of the [facility] please call [phone].    LABOR INSTRUCTIONS  If you would like to try and avoid the use of pain medications for your labor, one of the best things you can do is to stay home as long as your and your baby s condition allow it, especially during the early part of your labor.  The Midwife on call can help guide you through this part if you are unsure, call us at [phone] any time, day or night.  Part of our assessment will be in speaking with you directly on the phone.  How do I know if I am in active labor?   Active labor will be regular, painful contractions that are lasting a minute or more and coming every 5 minutes for more than an hour in a row.  The intensity, or how much they hurt is more important that how frequently or often the contractions are coming.  You could try and take a hot shower or tub bath, if the contractions continue to increase in intensity or frequency, it can be a sign of active labor.  If you feel like you could lay down and doze between contractions, then do that, doze, let your contraction come and know that your body is working towards having your baby and then relax and rest again.  You could try having a light snack, like fruit, cereal or toast and continue to drink fluids.    Reasons to call the On Call Midwife  @ [phone]  o If you have vaginal bleeding like a heavy period  o If you think your water broke  o If you think your baby has not been moving enough  o If you think you are in labor  o If you are worried or concerned about yourself or your baby      PAIN MEDICATIONS IN LABOR  Should I use pain medications in labor?  This decision to use pain medication or not during labor is for each woman to decide.  Some women have very strong feelings one way or the other, others would like to wait and see how it goes.  My personal recommendation would be to be flexible.  If someone is laboring and coping well with the process, they are making progress and do not want pain medication then they don t need it.  We have bathtubs, birthing balls, labor slings, rocking chairs and a labor position menu to support laboring without intervention and/or pain medication.  However, if someone has been awake with early labor for several nights, is having a hard time coping with the process, or wants pain medication, then pain medication can be a helpful tool to have.  What types of pain medications are available to me during my labor?    Nitrous Oxide  Nitrous Oxide is a quick acting gas that you breathe in through a mask.  It is used all over the world for labor pain relief.  Many dentists use it too.  We call it  nitrous  for short, other people may call it laughing gas.  We have a handout with more details about using Nitrous Oxide during labor.    IV narcotic/Relaxer medications  Narcotic pain medications are medicines that go into your IV or as a shot into your muscle.  They help to  take the edge off the pain .  Thy do not, in general remove the pain.  They can make you feel kind of sleepy and help you relax better, especially in the earlier parts of labor.  Most of the time they last a short time (1-4 hours).  These medicines do  get to  your baby.  So your baby may get sleepy too.  This does not hurt your baby.  We do not give  these medications if we think you will give birth to your baby soon.  This way your body will get rid of the medicine before your baby is born.    Epidural  Epidural is the most common pain medicine used by women in labor across the [place].  It is given to you by a special doctor called an Anesthesiologist.  An epidural is a tiny, soft tube that is placed into your lower back that can give you pain medicine until after your baby is born.  Most often, a labor epidural provides the most complete labor pain relief that we have available.  A labor epidural is placed by a special doctor call an Anesthesiologist.  In order to have one placed you need to have an IV, and then sit on the side of the bed in kind of a slouched position curling around your baby.  After an epidural is placed you will need to stay in bed, we will help you change positions from side to side and sitting up to help your labor continue to progress.  We will also empty your bladder with a catheter about every three hours.    What Should I Bring to the Hospital?    A picture ID    Your insurance card    Your birth plan if you have one    Eyeglasses and/or contacts    Toiletries: Toothbrush and toothpaste, lip balm (it can be very dry at the hospital) lotion, deodorant, a brush or comb, hairband or barrettes    A bathrobe if you like to wear one    Warm sock or slippers    A camera and     Cell phone and cell phone     A comfy nursing bra    A soft blanket for baby    An infant car seat    A going home outfit for you, soft and comfortable, something you may have worn when you were about 5 months pregnant    A going home outfit and hat for your baby    Snacks for your support people    Some optional treats for you and your partner:  o A pillow from home  o Flameless candles  o A music player  o Treats to eat in postpartum like granola bars, cookies and fruit  o A breastfeeding pillow - especially if you intend to use one when you go  home    * * * *   We are looking forward to working with you during this special time in your life.  If you have questions or have something we can do to help you, please don't hesitate to ask at one of your appointments, on AppsBuildert or calling [phone].    Kitty Lau                   Follow-ups after your visit        Your next 10 appointments already scheduled     May 29, 2018  4:00 PM CDT   ESTABLISHED PRENATAL with DORINDA Nevarez CNM   [facility] [place]    [address] [place] [address] [phone]            Jun 11, 2018  4:00 PM CDT   ESTABLISHED PRENATAL with DORINDA Nevarez CNM   [facility] ([address] [address] [phone]              Who to contact     If you have questions or need follow up information about today's clinic visit or your schedule please contact [facility] directly at [phone].  Normal or non-critical lab and imaging results will be communicated to you by Loyalty Labhart, letter or phone within 4 business days after the clinic has received the results. If you do not hear from us within 7 days, please contact the clinic through PadSquadt or phone. If you have a critical or abnormal lab result, we will notify you by phone as soon as possible.  Submit refill requests through SensorWave or call your pharmacy and they will forward the refill request to us. Please allow 3 business days for your refill to be completed.          Additional Information About Your Visit        Loyalty LabharPixium Vision Information     SensorWave gives you secure access to your electronic health record. If you see a primary care provider, you can also send messages to your care team and make appointments. If you have questions, please call your primary care clinic.  If you do not have a primary care provider, please call [phone] and they  "will assist you.        Care EveryWhere ID     This is your Care EveryWhere ID. This could be used by other organizations to access your [facility] medical records  [id number]        Your Vitals Were     Pulse Height Last Period BMI (Body Mass Index)          85 5' 8\" (1.727 m) 09/06/2017 25.73 kg/m2         Blood Pressure from Last 3 Encounters:   05/14/18 132/85   04/30/18 132/87   04/19/18 120/80    Weight from Last 3 Encounters:   05/14/18 169 lb 3.2 oz (76.7 kg)   04/30/18 166 lb 9.6 oz (75.6 kg)   04/19/18 163 lb (73.9 kg)              Today, you had the following     No orders found for display       Primary Care Provider Office Phone # Fax #    Marychuy Murphy [phone]       [facility] [address] [facility] [address]        Equal Access to Services     GILL QUICK AH: Hadii noe ku hadasho Soomaali, waaxda luqadaha, qaybta kaalmada adeegyada, jose shen . So [facility] [phone].    ATENCIÓN: Si yonisla espmaged, tiene a benitez disposición servicios gratuitos de asistencia lingüística. Llame al [phone].    We comply with applicable federal civil rights laws and [place] laws. We do not discriminate on the basis of race, color, national origin, age, disability, sex, sexual orientation, or gender identity.            Thank you!     Thank you for choosing [facility]  for your care. Our goal is always to provide you with excellent care. Hearing back from our patients is one way we can continue to improve our services. Please take a few minutes to complete the written survey that you may receive in the mail after your visit with us. Thank you!             Your Updated Medication List - Protect others around you: Learn how to safely use, store and throw away your medicines at www.disposemymeds.org.          This list is accurate as of 5/14/18  4:48 PM.  Always use your most recent med list.                   Brand Name Dispense " Instructions for use Diagnosis    Prenatal Vitamin 27-0.8 MG Tabs

## 2018-05-22 ENCOUNTER — PRENATAL OFFICE VISIT (OUTPATIENT)
Dept: MIDWIFE SERVICES | Facility: CLINIC | Age: 38
End: 2018-05-22
Payer: COMMERCIAL

## 2018-05-22 VITALS
SYSTOLIC BLOOD PRESSURE: 133 MMHG | TEMPERATURE: 97.1 F | WEIGHT: 170 LBS | HEART RATE: 80 BPM | BODY MASS INDEX: 25.85 KG/M2 | DIASTOLIC BLOOD PRESSURE: 86 MMHG

## 2018-05-22 DIAGNOSIS — O09.523 ELDERLY MULTIGRAVIDA IN THIRD TRIMESTER: Primary | ICD-10-CM

## 2018-05-22 LAB — HGB BLD-MCNC: 12.1 G/DL (ref 11.7–15.7)

## 2018-05-22 PROCEDURE — 00000218 ZZHCL STATISTIC OBHBG - HEMOGLOBIN: Performed by: ADVANCED PRACTICE MIDWIFE

## 2018-05-22 PROCEDURE — 87653 STREP B DNA AMP PROBE: CPT | Performed by: ADVANCED PRACTICE MIDWIFE

## 2018-05-22 PROCEDURE — 99207 ZZC PRENATAL VISIT: CPT | Performed by: ADVANCED PRACTICE MIDWIFE

## 2018-05-22 PROCEDURE — 36416 COLLJ CAPILLARY BLOOD SPEC: CPT | Performed by: ADVANCED PRACTICE MIDWIFE

## 2018-05-22 NOTE — PROGRESS NOTES
36w6d  Patient feeling well. Positive fetal movement. Denies water leaking, vaginal bleeding, decreased fetal movement, contraction pain, or headaches.   Doing well. No questions or concerns today. They do not have a birth plan but plan to try and use no medications but open to whatever happens. BSUS to confirm cephalic position. GBS and HGB today. Does not desire water birth but may labor in the tub.   Danger signs reviewed, pre-eclampsia signs and symptoms discussed.   Knows when to call triage and has phone numbers.   Follow up in 1 week.   Francesca MAURICIO

## 2018-05-22 NOTE — MR AVS SNAPSHOT
After Visit Summary   5/22/2018    Moon Ordoñez    MRN: [id number]           Patient Information     Date Of Birth          1980        Visit Information        Provider Department      5/22/2018 4:45 PM Francesca Holt APRN [place] [facility]        Today's Diagnoses     Elderly multigravida in third trimester    -  1       Follow-ups after your visit        Your next 10 appointments already scheduled     May 29, 2018  4:00 PM CDT   ESTABLISHED PRENATAL with DORINDA Nevarez CNM   [facility] [place]    [address] [place] [address]   [phone]            Jun 05, 2018  5:30 PM CDT   MyChart OB-GYN Established Prenatal with DORINDA Lee CNM   [facility] [place]    [address] [place] [address] [address]  [facility] [address] [phone]            Jun 11, 2018  4:00 PM CDT   ESTABLISHED PRENATAL with DORINDA Nevarez CNM   [facility] [place]    [address] [place] [address] [phone]              Who to contact     If you have questions or need follow up information about today's clinic visit or your schedule please contact [facility] directly at [phone].  Normal or non-critical lab and imaging results will be communicated to you by MyChart, letter or phone within 4 business days after the clinic has received the results. If you do not hear from us within 7 days, please contact the clinic through MyChart or phone. If you have a critical or abnormal lab result, we will notify you by phone as soon as possible.  Submit refill requests through Fluent Home or call your pharmacy and they will forward the refill request to us. Please allow 3 business days for your refill to be completed.          Additional Information About Your Visit        [facility]  Information     ShozusilvaCoupz gives you secure access to your electronic health record. If you see a primary care provider, you can also send messages to your care team and make appointments. If you have questions, please call your primary care clinic.  If you do not have a primary care provider, please call [phone] and they will assist you.        Care EveryWhere ID     This is your Care EveryWhere ID. This could be used by other organizations to access your [facility] medical records  [id number]        Your Vitals Were     Pulse Temperature Last Period BMI (Body Mass Index)          80 97.1  F (36.2  C) (Oral) 09/06/2017 25.85 kg/m2         Blood Pressure from Last 3 Encounters:   05/22/18 133/86   05/14/18 132/85   04/30/18 132/87    Weight from Last 3 Encounters:   05/22/18 170 lb (77.1 kg)   05/14/18 169 lb 3.2 oz (76.7 kg)   04/30/18 166 lb 9.6 oz (75.6 kg)              We Performed the Following     OB hemoglobin     Strep, Group B by PCR        Primary Care Provider Office Phone # Fax #    Marychuy Murphy [phone]       [facility] [address] [id number]        Equal Access to Services     GILL QUICK AH: Hadii noe ku hadasho Soomaali, waaxda luqadaha, qaybta kaalmada adeegyada, jose maddox. So [facility] [phone].    ATENCIÓN: Si habla español, tiene a benitez disposición servicios gratuitos de asistencia lingüística. Llame al [phone].    We comply with applicable federal civil rights laws and [place] laws. We do not discriminate on the basis of race, color, national origin, age, disability, sex, sexual orientation, or gender identity.            Thank you!     Thank you for choosing [facility]  for your care. Our goal is always to provide you with excellent care. Hearing back from our patients is one way we can continue to improve our services. Please take a few minutes to complete the written survey that you may  receive in the mail after your visit with us. Thank you!             Your Updated Medication List - Protect others around you: Learn how to safely use, store and throw away your medicines at www.disposemymeds.org.          This list is accurate as of 5/22/18  6:03 PM.  Always use your most recent med list.                   Brand Name Dispense Instructions for use Diagnosis    Prenatal Vitamin 27-0.8 MG Tabs

## 2018-05-24 ENCOUNTER — HOSPITAL ENCOUNTER (OUTPATIENT)
Facility: CLINIC | Age: 38
End: 2018-05-24
Payer: COMMERCIAL

## 2018-05-24 LAB
GP B STREP DNA SPEC QL NAA+PROBE: NEGATIVE
SPECIMEN SOURCE: NORMAL

## 2018-05-29 ENCOUNTER — PRENATAL OFFICE VISIT (OUTPATIENT)
Dept: MIDWIFE SERVICES | Facility: CLINIC | Age: 38
End: 2018-05-29
Payer: COMMERCIAL

## 2018-05-29 VITALS
SYSTOLIC BLOOD PRESSURE: 129 MMHG | WEIGHT: 167 LBS | DIASTOLIC BLOOD PRESSURE: 88 MMHG | BODY MASS INDEX: 25.39 KG/M2 | HEART RATE: 71 BPM

## 2018-05-29 DIAGNOSIS — O09.523 ELDERLY MULTIGRAVIDA IN THIRD TRIMESTER: Primary | ICD-10-CM

## 2018-05-29 PROCEDURE — 99207 ZZC PRENATAL VISIT: CPT | Performed by: ADVANCED PRACTICE MIDWIFE

## 2018-05-29 NOTE — PROGRESS NOTES
37w6d  Participated in shared wisdom session number 7, pediatrician visit. Follow up in 1 week. Franecsca Holt CNM

## 2018-05-29 NOTE — MR AVS SNAPSHOT
After Visit Summary   5/29/2018    Moon Ordoñez    MRN: [id number]           Patient Information     Date Of Birth          1980        Visit Information        Provider Department      5/29/2018 4:00 PM Francesca Holt APRN CNM [facility] [place]        Today's Diagnoses     Elderly multigravida in third trimester    -  1       Follow-ups after your visit        Your next 10 appointments already scheduled     Jun 05, 2018  5:30 PM CDT   [facility] OB-GYN Established Prenatal with DORINDA Lee CNM   [facility] [place]    [address] [place] [address] [address]  [facility] [address] [phone]            Jun 11, 2018  4:00 PM CDT   ESTABLISHED PRENATAL with DORINDA Nevarez CNM   [facility] [place]    [address] [place] [address]  [facility] [address] [phone]              Who to contact     If you have questions or need follow up information about today's clinic visit or your schedule please contact [facility] directly at [phone].  Normal or non-critical lab and imaging results will be communicated to you by MyChart, letter or phone within 4 business days after the clinic has received the results. If you do not hear from us within 7 days, please contact the clinic through Strike New Media Limitedhart or phone. If you have a critical or abnormal lab result, we will notify you by phone as soon as possible.  Submit refill requests through PCS Edventures or call your pharmacy and they will forward the refill request to us. Please allow 3 business days for your refill to be completed.          Additional Information About Your Visit        MyChart Information     PCS Edventures gives you secure access to your electronic health record. If you see a primary care provider, you can also send messages to your care team and make appointments. If you have questions, please call your primary  care clinic.  If you do not have a primary care provider, please call [phone] and they will assist you.        Care EveryWhere ID     This is your Care EveryWhere ID. This could be used by other organizations to access your [place] medical records  [id number]        Your Vitals Were     Pulse Last Period BMI (Body Mass Index)             71 09/06/2017 25.39 kg/m2          Blood Pressure from Last 3 Encounters:   05/29/18 129/88   05/22/18 133/86   05/14/18 132/85    Weight from Last 3 Encounters:   05/29/18 167 lb (75.8 kg)   05/22/18 170 lb (77.1 kg)   05/14/18 169 lb 3.2 oz (76.7 kg)              Today, you had the following     No orders found for display       Primary Care Provider Office Phone # Fax #    Marychuy Murphy [phone]       [facility] [address] [facility] [address]        Equal Access to Services     GILL QUICK : Hadii aad ku hadasho Soomaali, waaxda luqadaha, qaybta kaalmada adeegyada, waxay idiin hayjaylan lilly shen . So [facility] [phone].    ATENCIÓN: Si habla español, tiene a benitez disposición servicios gratuitos de asistencia lingüística. Llame al [phone].    We comply with applicable federal civil rights laws and [place] laws. We do not discriminate on the basis of race, color, national origin, age, disability, sex, sexual orientation, or gender identity.            Thank you!     Thank you for choosing [facility]  for your care. Our goal is always to provide you with excellent care. Hearing back from our patients is one way we can continue to improve our services. Please take a few minutes to complete the written survey that you may receive in the mail after your visit with us. Thank you!             Your Updated Medication List - Protect others around you: Learn how to safely use, store and throw away your medicines at www.disposemymeds.org.          This list is accurate as of 5/29/18 11:59 PM.  Always use your  most recent med list.                   Brand Name Dispense Instructions for use Diagnosis    Prenatal Vitamin 27-0.8 MG Tabs

## 2018-06-05 ENCOUNTER — PRENATAL OFFICE VISIT (OUTPATIENT)
Dept: MIDWIFE SERVICES | Facility: CLINIC | Age: 38
End: 2018-06-05
Payer: COMMERCIAL

## 2018-06-05 VITALS
BODY MASS INDEX: 25.85 KG/M2 | WEIGHT: 170 LBS | HEART RATE: 72 BPM | SYSTOLIC BLOOD PRESSURE: 132 MMHG | DIASTOLIC BLOOD PRESSURE: 90 MMHG

## 2018-06-05 DIAGNOSIS — R03.0 ELEVATED BLOOD PRESSURE READING WITHOUT DIAGNOSIS OF HYPERTENSION: ICD-10-CM

## 2018-06-05 DIAGNOSIS — Z34.03 ENCOUNTER FOR SUPERVISION OF NORMAL FIRST PREGNANCY IN THIRD TRIMESTER: Primary | ICD-10-CM

## 2018-06-05 LAB
ALT SERPL W P-5'-P-CCNC: 14 U/L (ref 0–50)
AST SERPL W P-5'-P-CCNC: 20 U/L (ref 0–45)
CREAT UR-MCNC: 56 MG/DL
ERYTHROCYTE [DISTWIDTH] IN BLOOD BY AUTOMATED COUNT: 13.1 % (ref 10–15)
HCT VFR BLD AUTO: 37.3 % (ref 35–47)
HGB BLD-MCNC: 12.7 G/DL (ref 11.7–15.7)
MCH RBC QN AUTO: 31.9 PG (ref 26.5–33)
MCHC RBC AUTO-ENTMCNC: 34 G/DL (ref 31.5–36.5)
MCV RBC AUTO: 94 FL (ref 78–100)
PLATELET # BLD AUTO: 189 10E9/L (ref 150–450)
PROT UR-MCNC: 0.14 G/L
PROT/CREAT 24H UR: 0.26 G/G CR (ref 0–0.2)
RBC # BLD AUTO: 3.98 10E12/L (ref 3.8–5.2)
WBC # BLD AUTO: 10.2 10E9/L (ref 4–11)

## 2018-06-05 PROCEDURE — 36415 COLL VENOUS BLD VENIPUNCTURE: CPT | Performed by: ADVANCED PRACTICE MIDWIFE

## 2018-06-05 PROCEDURE — 84450 TRANSFERASE (AST) (SGOT): CPT | Performed by: ADVANCED PRACTICE MIDWIFE

## 2018-06-05 PROCEDURE — 99207 ZZC PRENATAL VISIT: CPT | Performed by: ADVANCED PRACTICE MIDWIFE

## 2018-06-05 PROCEDURE — 84460 ALANINE AMINO (ALT) (SGPT): CPT | Performed by: ADVANCED PRACTICE MIDWIFE

## 2018-06-05 PROCEDURE — 85027 COMPLETE CBC AUTOMATED: CPT | Performed by: ADVANCED PRACTICE MIDWIFE

## 2018-06-05 PROCEDURE — 84156 ASSAY OF PROTEIN URINE: CPT | Performed by: ADVANCED PRACTICE MIDWIFE

## 2018-06-05 NOTE — PROGRESS NOTES
Pt and  here. BP slightly elevated today, 132/90.  Pt denies HA, changes to vision, epigastric pain.  Will do preeclampsia panel today and have pt return to clinic in 3 days for another BP check.  Warning s/s of preeclampsia reviewed and when to call.  JR

## 2018-06-05 NOTE — MR AVS SNAPSHOT
After Visit Summary   6/5/2018    Moon Ordoñez    MRN: [id number]           Patient Information     Date Of Birth          1980        Visit Information        Provider Department      6/5/2018 5:30 PM Kitty Lau APRN CNM [facility]        Today's Diagnoses     Encounter for supervision of normal first pregnancy in third trimester    -  1    Elevated blood pressure reading without diagnosis of hypertension           Follow-ups after your visit        Your next 10 appointments already scheduled     Jun 11, 2018  4:00 PM CDT   ESTABLISHED PRENATAL with DORINDA Nevarez CNM   [facility]    [address] [place] [address] [phone]              Who to contact     If you have questions or need follow up information about today's clinic visit or your schedule please contact [facility] directly at [phone].  Normal or non-critical lab and imaging results will be communicated to you by MyChart, letter or phone within 4 business days after the clinic has received the results. If you do not hear from us within 7 days, please contact the clinic through 71lbshart or phone. If you have a critical or abnormal lab result, we will notify you by phone as soon as possible.  Submit refill requests through Vtap or call your pharmacy and they will forward the refill request to us. Please allow 3 business days for your refill to be completed.          Additional Information About Your Visit        MyChart Information     Vtap gives you secure access to your electronic health record. If you see a primary care provider, you can also send messages to your care team and make appointments. If you have questions, please call your primary care clinic.  If you do not have a primary care provider, please call [phone] and they will assist you.        Care EveryWhere ID     This  is your Care EveryWhere ID. This could be used by other organizations to access your [facility] medical records  [id number]        Your Vitals Were     Pulse Last Period BMI (Body Mass Index)             72 09/06/2017 25.85 kg/m2          Blood Pressure from Last 3 Encounters:   06/05/18 132/90   05/29/18 129/88   05/22/18 133/86    Weight from Last 3 Encounters:   06/05/18 170 lb (77.1 kg)   05/29/18 167 lb (75.8 kg)   05/22/18 170 lb (77.1 kg)              We Performed the Following     ALT     AST     CBC with platelets     Protein  random urine with Creat Ratio        Primary Care Provider Office Phone # Fax #    Marychuy Murphy [phone]       [facility] [address] [facility] [address]        Equal Access to Services     GILL QUICK : Hadii noe razoo Sodanie, waaxda luqadaha, qaybta kaalmada ladonna, jose shen . So [facility] [phone].    ATENCIÓN: Si habla español, tiene a benitez disposición servicios gratuitos de asistencia lingüística. Nae al [phone].    We comply with applicable federal civil rights laws and [place] laws. We do not discriminate on the basis of race, color, national origin, age, disability, sex, sexual orientation, or gender identity.            Thank you!     Thank you for choosing [facility]  for your care. Our goal is always to provide you with excellent care. Hearing back from our patients is one way we can continue to improve our services. Please take a few minutes to complete the written survey that you may receive in the mail after your visit with us. Thank you!             Your Updated Medication List - Protect others around you: Learn how to safely use, store and throw away your medicines at www.disposemymeds.org.          This list is accurate as of 6/5/18  5:57 PM.  Always use your most recent med list.                   Brand Name Dispense Instructions for use Diagnosis    Prenatal  Vitamin 27-0.8 MG Tabs

## 2018-06-07 PROBLEM — R03.0 ELEVATED BLOOD PRESSURE READING WITHOUT DIAGNOSIS OF HYPERTENSION: Status: ACTIVE | Noted: 2018-06-07

## 2018-06-08 ENCOUNTER — PRENATAL OFFICE VISIT (OUTPATIENT)
Dept: MIDWIFE SERVICES | Facility: CLINIC | Age: 38
End: 2018-06-08
Payer: COMMERCIAL

## 2018-06-08 VITALS
SYSTOLIC BLOOD PRESSURE: 128 MMHG | OXYGEN SATURATION: 96 % | HEIGHT: 68 IN | DIASTOLIC BLOOD PRESSURE: 92 MMHG | HEART RATE: 77 BPM | TEMPERATURE: 97.9 F | WEIGHT: 172.1 LBS | BODY MASS INDEX: 26.08 KG/M2

## 2018-06-08 DIAGNOSIS — O09.523 ELDERLY MULTIGRAVIDA IN THIRD TRIMESTER: Primary | ICD-10-CM

## 2018-06-08 PROCEDURE — 99207 ZZC PRENATAL VISIT: CPT | Performed by: ADVANCED PRACTICE MIDWIFE

## 2018-06-08 NOTE — Clinical Note
Patient calling at 8am tomorrow to make sure she can come in.   Hopefully she will be in spontaneous labor by then.  Have a good weekend.  Toma

## 2018-06-08 NOTE — PROGRESS NOTES
39w2d  Patient and  Eliezer here.   Pt has 2 BP elevated in last 3 days. Discussed that recommendation is induction of labor today.  Patient and  have lots of questions because she has been taking her BP several times per day at home and gets 120/high 80's and has not gotten elevated BP like we get here in clinic.   Pt would like to avoid this and would prefer to come into [facility] tomorrow.  Pt is aware of recommendation.  SVE; 2/60/-2, membranes stripped,  discussed that if not in labor tonight will likely need cervical ripening but decision would be made tomorrow.  Pt very fearful of pitocin induction and reviewed that not always what others describe on internet. Labor signs reviewed.  Knows when to call will to [facility] in morning. koko

## 2018-06-08 NOTE — MR AVS SNAPSHOT
After Visit Summary   6/8/2018    Moon Ordoñez    MRN: [id number]           Patient Information     Date Of Birth          1980        Visit Information        Provider Department      6/8/2018 4:15 PM Toma Hannon APRN CNM [facility]        Today's Diagnoses     Elderly multigravida in third trimester    -  1       Follow-ups after your visit        Your next 10 appointments already scheduled     Jun 11, 2018  4:00 PM CDT   ESTABLISHED PRENATAL with DORINDA Nevarez CNM   [facility] ([facility])    [address] [place] [id number] [phone]              Who to contact     If you have questions or need follow up information about today's clinic visit or your schedule please contact [facility] directly at [phone].  Normal or non-critical lab and imaging results will be communicated to you by MyChart, letter or phone within 4 business days after the clinic has received the results. If you do not hear from us within 7 days, please contact the clinic through [employer] or phone. If you have a critical or abnormal lab result, we will notify you by phone as soon as possible.  Submit refill requests through [employer] or call your pharmacy and they will forward the refill request to us. Please allow 3 business days for your refill to be completed.          Additional Information About Your Visit        MyChart Information     [employer] gives you secure access to your electronic health record. If you see a primary care provider, you can also send messages to your care team and make appointments. If you have questions, please call your primary care clinic.  If you do not have a primary care provider, please call [phone] and they will assist you.        Care EveryWhere ID     This is your Care EveryWhere ID. This could be used by other organizations to access your [facility]  "records  [id number]        Your Vitals Were     Pulse Temperature Height Last Period Pulse Oximetry BMI (Body Mass Index)    77 97.9  F (36.6  C) (Oral) 5' 8\" (1.727 m) 09/06/2017 96% 26.17 kg/m2       Blood Pressure from Last 3 Encounters:   06/08/18 (!) 128/92   06/05/18 132/90   05/29/18 129/88    Weight from Last 3 Encounters:   06/08/18 172 lb 1.6 oz (78.1 kg)   06/05/18 170 lb (77.1 kg)   05/29/18 167 lb (75.8 kg)              Today, you had the following     No orders found for display       Primary Care Provider Office Phone # Fax #    Marychuy Murphy [phone]       [facility] [address] [facility] [address]        Equal Access to Services     [facility] : Hadii noe razoo Sodanie, waaxda luqadaha, qaybta kaalmada adebriiyada, jose shen . So [facility] [phone].    ATENCIÓN: Si habla español, tiene a benitez disposición servicios gratuitos de asistencia lingüística. Nae al [phone].    We comply with applicable federal civil rights laws and [place] laws. We do not discriminate on the basis of race, color, national origin, age, disability, sex, sexual orientation, or gender identity.            Thank you!     Thank you for choosing [facility]  for your care. Our goal is always to provide you with excellent care. Hearing back from our patients is one way we can continue to improve our services. Please take a few minutes to complete the written survey that you may receive in the mail after your visit with us. Thank you!             Your Updated Medication List - Protect others around you: Learn how to safely use, store and throw away your medicines at www.disposemymeds.org.          This list is accurate as of 6/8/18  4:58 PM.  Always use your most recent med list.                   Brand Name Dispense Instructions for use Diagnosis    Prenatal Vitamin 27-0.8 MG Tabs             "

## 2018-06-09 ENCOUNTER — HOSPITAL ENCOUNTER (INPATIENT)
Facility: CLINIC | Age: 38
LOS: 3 days | Discharge: HOME | End: 2018-06-12
Attending: ADVANCED PRACTICE MIDWIFE | Admitting: ADVANCED PRACTICE MIDWIFE
Payer: COMMERCIAL

## 2018-06-09 LAB
ABO + RH BLD: NORMAL
ABO + RH BLD: NORMAL
ALT SERPL W P-5'-P-CCNC: 12 U/L (ref 0–50)
AST SERPL W P-5'-P-CCNC: 18 U/L (ref 0–45)
BASOPHILS # BLD AUTO: 0 10E9/L (ref 0–0.2)
BASOPHILS NFR BLD AUTO: 0.2 %
BLD GP AB SCN SERPL QL: NORMAL
BLOOD BANK CMNT PATIENT-IMP: NORMAL
CREAT SERPL-MCNC: 0.73 MG/DL (ref 0.52–1.04)
CREAT UR-MCNC: 29 MG/DL
DIFFERENTIAL METHOD BLD: ABNORMAL
EOSINOPHIL # BLD AUTO: 0.1 10E9/L (ref 0–0.7)
EOSINOPHIL NFR BLD AUTO: 0.4 %
ERYTHROCYTE [DISTWIDTH] IN BLOOD BY AUTOMATED COUNT: 12.6 % (ref 10–15)
GFR SERPL CREATININE-BSD FRML MDRD: >90 ML/MIN/1.7M2
HCT VFR BLD AUTO: 35.2 % (ref 35–47)
HGB BLD-MCNC: 12.1 G/DL (ref 11.7–15.7)
IMM GRANULOCYTES # BLD: 0.1 10E9/L (ref 0–0.4)
IMM GRANULOCYTES NFR BLD: 0.5 %
LYMPHOCYTES # BLD AUTO: 2.2 10E9/L (ref 0.8–5.3)
LYMPHOCYTES NFR BLD AUTO: 17.3 %
MCH RBC QN AUTO: 31.3 PG (ref 26.5–33)
MCHC RBC AUTO-ENTMCNC: 34.4 G/DL (ref 31.5–36.5)
MCV RBC AUTO: 91 FL (ref 78–100)
MONOCYTES # BLD AUTO: 0.7 10E9/L (ref 0–1.3)
MONOCYTES NFR BLD AUTO: 5.7 %
NEUTROPHILS # BLD AUTO: 9.5 10E9/L (ref 1.6–8.3)
NEUTROPHILS NFR BLD AUTO: 75.9 %
NRBC # BLD AUTO: 0 10*3/UL
NRBC BLD AUTO-RTO: 0 /100
PLATELET # BLD AUTO: 179 10E9/L (ref 150–450)
PROT UR-MCNC: 0.07 G/L
PROT/CREAT 24H UR: 0.26 G/G CR (ref 0–0.2)
RBC # BLD AUTO: 3.86 10E12/L (ref 3.8–5.2)
SPECIMEN EXP DATE BLD: NORMAL
WBC # BLD AUTO: 12.5 10E9/L (ref 4–11)

## 2018-06-09 PROCEDURE — 84450 TRANSFERASE (AST) (SGOT): CPT | Performed by: ADVANCED PRACTICE MIDWIFE

## 2018-06-09 PROCEDURE — 25000132 ZZH RX MED GY IP 250 OP 250 PS 637: Performed by: ADVANCED PRACTICE MIDWIFE

## 2018-06-09 PROCEDURE — 86901 BLOOD TYPING SEROLOGIC RH(D): CPT | Performed by: ADVANCED PRACTICE MIDWIFE

## 2018-06-09 PROCEDURE — 12000032 ZZH R&B OB CRITICAL UMMC

## 2018-06-09 PROCEDURE — 84460 ALANINE AMINO (ALT) (SGPT): CPT | Performed by: ADVANCED PRACTICE MIDWIFE

## 2018-06-09 PROCEDURE — 85025 COMPLETE CBC W/AUTO DIFF WBC: CPT | Performed by: ADVANCED PRACTICE MIDWIFE

## 2018-06-09 PROCEDURE — 86900 BLOOD TYPING SEROLOGIC ABO: CPT | Performed by: ADVANCED PRACTICE MIDWIFE

## 2018-06-09 PROCEDURE — 82565 ASSAY OF CREATININE: CPT | Performed by: ADVANCED PRACTICE MIDWIFE

## 2018-06-09 PROCEDURE — 86780 TREPONEMA PALLIDUM: CPT | Performed by: ADVANCED PRACTICE MIDWIFE

## 2018-06-09 PROCEDURE — 86850 RBC ANTIBODY SCREEN: CPT | Performed by: ADVANCED PRACTICE MIDWIFE

## 2018-06-09 PROCEDURE — 84156 ASSAY OF PROTEIN URINE: CPT | Performed by: ADVANCED PRACTICE MIDWIFE

## 2018-06-09 PROCEDURE — 59025 FETAL NON-STRESS TEST: CPT | Mod: 26 | Performed by: ADVANCED PRACTICE MIDWIFE

## 2018-06-09 RX ORDER — OXYTOCIN/0.9 % SODIUM CHLORIDE 30/500 ML
PLASTIC BAG, INJECTION (ML) INTRAVENOUS
Status: DISCONTINUED
Start: 2018-06-09 | End: 2018-06-10 | Stop reason: HOSPADM

## 2018-06-09 RX ORDER — MISOPROSTOL 200 UG/1
TABLET ORAL
Status: DISCONTINUED
Start: 2018-06-09 | End: 2018-06-10 | Stop reason: HOSPADM

## 2018-06-09 RX ORDER — OXYCODONE AND ACETAMINOPHEN 5; 325 MG/1; MG/1
1 TABLET ORAL
Status: DISCONTINUED | OUTPATIENT
Start: 2018-06-09 | End: 2018-06-10

## 2018-06-09 RX ORDER — SODIUM CHLORIDE, SODIUM LACTATE, POTASSIUM CHLORIDE, CALCIUM CHLORIDE 600; 310; 30; 20 MG/100ML; MG/100ML; MG/100ML; MG/100ML
10-125 INJECTION, SOLUTION INTRAVENOUS CONTINUOUS
Status: DISCONTINUED | OUTPATIENT
Start: 2018-06-09 | End: 2018-06-10

## 2018-06-09 RX ORDER — MAGNESIUM SULFATE HEPTAHYDRATE 40 MG/ML
4 INJECTION, SOLUTION INTRAVENOUS
Status: DISCONTINUED | OUTPATIENT
Start: 2018-06-09 | End: 2018-06-10

## 2018-06-09 RX ORDER — NALOXONE HYDROCHLORIDE 0.4 MG/ML
.1-.4 INJECTION, SOLUTION INTRAMUSCULAR; INTRAVENOUS; SUBCUTANEOUS
Status: DISCONTINUED | OUTPATIENT
Start: 2018-06-09 | End: 2018-06-10

## 2018-06-09 RX ORDER — SODIUM CHLORIDE, SODIUM LACTATE, POTASSIUM CHLORIDE, CALCIUM CHLORIDE 600; 310; 30; 20 MG/100ML; MG/100ML; MG/100ML; MG/100ML
INJECTION, SOLUTION INTRAVENOUS CONTINUOUS
Status: DISCONTINUED | OUTPATIENT
Start: 2018-06-09 | End: 2018-06-10

## 2018-06-09 RX ORDER — MISOPROSTOL 100 UG/1
25 TABLET ORAL EVERY 4 HOURS PRN
Status: DISCONTINUED | OUTPATIENT
Start: 2018-06-09 | End: 2018-06-10

## 2018-06-09 RX ORDER — ONDANSETRON 2 MG/ML
4 INJECTION INTRAMUSCULAR; INTRAVENOUS EVERY 6 HOURS PRN
Status: DISCONTINUED | OUTPATIENT
Start: 2018-06-09 | End: 2018-06-10

## 2018-06-09 RX ORDER — LIDOCAINE HYDROCHLORIDE 10 MG/ML
INJECTION, SOLUTION INFILTRATION; PERINEURAL
Status: DISCONTINUED
Start: 2018-06-09 | End: 2018-06-10 | Stop reason: HOSPADM

## 2018-06-09 RX ORDER — IBUPROFEN 800 MG/1
800 TABLET, FILM COATED ORAL
Status: DISCONTINUED | OUTPATIENT
Start: 2018-06-09 | End: 2018-06-10

## 2018-06-09 RX ORDER — NIFEDIPINE 10 MG/1
10 CAPSULE ORAL
Status: DISCONTINUED | OUTPATIENT
Start: 2018-06-09 | End: 2018-06-10

## 2018-06-09 RX ORDER — HYDRALAZINE HYDROCHLORIDE 20 MG/ML
5 INJECTION INTRAMUSCULAR; INTRAVENOUS
Status: DISCONTINUED | OUTPATIENT
Start: 2018-06-09 | End: 2018-06-10

## 2018-06-09 RX ORDER — LORAZEPAM 2 MG/ML
2 INJECTION INTRAMUSCULAR
Status: DISCONTINUED | OUTPATIENT
Start: 2018-06-09 | End: 2018-06-10

## 2018-06-09 RX ORDER — TERBUTALINE SULFATE 1 MG/ML
0.25 INJECTION, SOLUTION SUBCUTANEOUS
Status: DISCONTINUED | OUTPATIENT
Start: 2018-06-09 | End: 2018-06-10

## 2018-06-09 RX ORDER — MAGNESIUM SULFATE IN WATER 40 MG/ML
2 INJECTION, SOLUTION INTRAVENOUS CONTINUOUS
Status: DISCONTINUED | OUTPATIENT
Start: 2018-06-09 | End: 2018-06-10

## 2018-06-09 RX ORDER — MAGNESIUM SULFATE HEPTAHYDRATE 500 MG/ML
4 INJECTION, SOLUTION INTRAMUSCULAR; INTRAVENOUS
Status: DISCONTINUED | OUTPATIENT
Start: 2018-06-09 | End: 2018-06-10

## 2018-06-09 RX ORDER — METHYLERGONOVINE MALEATE 0.2 MG/ML
200 INJECTION INTRAVENOUS
Status: COMPLETED | OUTPATIENT
Start: 2018-06-09 | End: 2018-06-10

## 2018-06-09 RX ORDER — MAGNESIUM SULFATE HEPTAHYDRATE 40 MG/ML
4 INJECTION, SOLUTION INTRAVENOUS ONCE
Status: DISCONTINUED | OUTPATIENT
Start: 2018-06-09 | End: 2018-06-10

## 2018-06-09 RX ORDER — HYDRALAZINE HYDROCHLORIDE 20 MG/ML
10 INJECTION INTRAMUSCULAR; INTRAVENOUS
Status: DISCONTINUED | OUTPATIENT
Start: 2018-06-09 | End: 2018-06-10

## 2018-06-09 RX ORDER — OXYTOCIN 10 [USP'U]/ML
10 INJECTION, SOLUTION INTRAMUSCULAR; INTRAVENOUS
Status: DISCONTINUED | OUTPATIENT
Start: 2018-06-09 | End: 2018-06-10

## 2018-06-09 RX ORDER — OXYTOCIN 10 [USP'U]/ML
INJECTION, SOLUTION INTRAMUSCULAR; INTRAVENOUS
Status: DISCONTINUED
Start: 2018-06-09 | End: 2018-06-10 | Stop reason: HOSPADM

## 2018-06-09 RX ORDER — CARBOPROST TROMETHAMINE 250 UG/ML
250 INJECTION, SOLUTION INTRAMUSCULAR
Status: DISCONTINUED | OUTPATIENT
Start: 2018-06-09 | End: 2018-06-10

## 2018-06-09 RX ORDER — ACETAMINOPHEN 325 MG/1
650 TABLET ORAL EVERY 4 HOURS PRN
Status: DISCONTINUED | OUTPATIENT
Start: 2018-06-09 | End: 2018-06-10

## 2018-06-09 RX ORDER — OXYTOCIN/0.9 % SODIUM CHLORIDE 30/500 ML
100-340 PLASTIC BAG, INJECTION (ML) INTRAVENOUS CONTINUOUS PRN
Status: DISCONTINUED | OUTPATIENT
Start: 2018-06-09 | End: 2018-06-10

## 2018-06-09 RX ORDER — CALCIUM GLUCONATE 94 MG/ML
1 INJECTION, SOLUTION INTRAVENOUS
Status: DISCONTINUED | OUTPATIENT
Start: 2018-06-09 | End: 2018-06-10

## 2018-06-09 RX ADMIN — Medication 25 MCG: at 09:37

## 2018-06-09 NOTE — PROGRESS NOTES
"S: Patient is doing well. Getting more uncomfortable and having to stop and breath through some contractions. Irregular but marija well. She wants to rest for a while before going for another walk. No questions or concerns today. Discussed checking her cervix between 6-8 pm to make sure she is making good progress.     O:  Blood pressure 129/76, temperature 97.8  F (36.6  C), temperature source Oral, resp. rate 18, height 1.727 m (5' 8\"), weight 78 kg (172 lb), last menstrual period 2017, unknown if currently breastfeeding.  General appearance: uncomfortable with contractions    CONTACTIONS: Contractions every 2-5 minutes.  Palpate: mild  FETAL HEART TONES: baseline 140 with moderate FHR variability and    accelerations yes. Decelerations no.    NST: REACTIVE  ROM: not ruptured  PELVIC EXAM:deferred  Fetal Position: cephalic   Bloody show: No  Pitocin- none,  Antibiotics- none  Cervical ripening: N/A    ASSESSMENT:  ==============  IUP @ 39w3d IOL for gestational hypertension   Fetal Heart rate tracing Category category one  GBS- negative     PLAN:  ===========  Pain medication if patient desires   Anticipate   Reevaluate in 2-4 hours/PRN   Labor augmentation if needed     Francesca Holt CNM    "

## 2018-06-09 NOTE — PLAN OF CARE
Problem: Patient Care Overview  Goal: Individualization & Mutuality  Patient is very uncomfortable, ambulating with family support. See flow sheet for fetal and uterine monitoring. VSS, afebrile. Patient is planning to go natural without pain meds. She will require pain meds if needed. Will continue to monitor labor and notify the provider with changes.

## 2018-06-09 NOTE — IP AVS SNAPSHOT
MRN:[id number]                      After Visit Summary   6/9/2018    Moon Ordoñez    MRN: [id number]           Thank you!     Thank you for choosing [facility] for your care. Our goal is always to provide you with excellent care. Hearing back from our patients is one way we can continue to improve our services. Please take a few minutes to complete the written survey that you may receive in the mail after you visit with us. Thank you!        Patient Information     Date Of Birth          1980        Designated Caregiver       Most Recent Value    Caregiver    Will someone help with your care after discharge? no      About your hospital stay     You were admitted on:  June 9, 2018 You last received care in the:  [facility]    You were discharged on:  June 12, 2018       Who to Call     For medical emergencies, please call 911.  For non-urgent questions about your medical care, please call your primary care provider or clinic, [phone]          Attending Provider     Provider Specialty    Francesca Holt APRN [facility]    Garima Bains APRN CN [facility]       Primary Care Provider Office Phone # Fax #    Marychuy Murphy [phone]      Further instructions from your care team       Postpartum Vaginal Delivery Instructions    Activity       Ask family and friends for help when you need it.    Do not place anything in your vagina for 6 weeks.    You are not restricted on other activities, but take it easy for a few weeks to allow your body to recover from delivery.  You are able to do any activities you feel up to that point.    No driving until you have stopped taking your pain medications (usually two weeks after delivery).     Call your health care provider if you have any of these symptoms:       Increased pain, swelling, redness, or fluid around your stiches from an episiotomy or perineal tear.    A fever above 100.4 F (38 C) with or without  "chills when placing a thermometer under your tongue.    You soak a sanitary pad with blood within 1 hour, or you see blood clots larger than a golf ball.    Bleeding that lasts more than 6 weeks.    Vaginal discharge that smells bad.    Severe pain, cramping or tenderness in your lower belly area.    A need to urinate more frequently (use the toilet more often), more urgently (use the toilet very quickly), or it burns when you urinate.    Nausea and vomiting.    Redness, swelling or pain around a vein in your leg.    Problems breastfeeding or a red or painful area on your breast.    Chest pain and cough or are gasping for air.    Problems coping with sadness, anxiety, or depression.  If you have any concerns about hurting yourself or the baby, call your provider immediately.     You have questions or concerns after you return home.     Keep your hands clean:  Always wash your hands before touching your perineal area and stitches.  This helps reduce your risk of infection.  If your hands aren't dirty, you may use an alcohol hand-rub to clean your hands. Keep your nails clean and short.      Follow up in 6 week for post delivery check up.     Pending Results     No orders found from 6/7/2018 to 6/10/2018.            Statement of Approval     Ordered          06/12/18 0913  I have reviewed and agree with all the recommendations and orders detailed in this document.  EFFECTIVE NOW     Approved and electronically signed by:  Dany Hopper APRN CNM             Admission Information     Date & Time Provider Department Dept. Phone    6/9/2018 Garima Bains APRN CNM [facility] [phone]      Your Vitals Were     Blood Pressure Pulse Temperature Respirations Height Weight    134/78 78 97.7  F (36.5  C) (Oral) 18 1.727 m (5' 8\") 78 kg (172 lb)    Last Period Pulse Oximetry BMI (Body Mass Index)             09/06/2017 96% 26.15 kg/m2         MyChart Information     [employer] gives you secure access to your electronic health " record. If you see a primary care provider, you can also send messages to your care team and make appointments. If you have questions, please call your primary care clinic.  If you do not have a primary care provider, please call [phone] and they will assist you.        Care EveryWhere ID     This is your Care EveryWhere ID. This could be used by other organizations to access your [facility] medical records  [id number]        Equal Access to Services     GILL QUICK : Shelia Mendieta, ashanti velasquez, qaphilippe kaalmalb dougherty, jose shen . So [facility] [phone].    ATENCIÓN: Si letty puga, tiene a benitez disposición servicios gratuitos de asistencia lingüística. Nae al [phone].    We comply with applicable federal civil rights laws and [place] laws. We do not discriminate on the basis of race, color, national origin, age, disability, sex, sexual orientation, or gender identity.               Review of your medicines      START taking        Dose / Directions    ibuprofen 800 MG tablet   Commonly known as:  ADVIL/MOTRIN   Used for:   (normal spontaneous vaginal delivery)        Dose:  800 mg   Take 1 tablet (800 mg) by mouth every 6 hours as needed for other (cramping)   Quantity:  90 tablet   Refills:  0       lanolin ointment   Used for:   (normal spontaneous vaginal delivery), Breast feeding status of mother        Apply topically every hour as needed for dry skin (sore nipples)   Quantity:  40 g   Refills:  0       senna-docusate 8.6-50 MG per tablet   Commonly known as:  SENOKOT-S;PERICOLACE   Used for:   (normal spontaneous vaginal delivery)        Dose:  1 tablet   Take 1 tablet by mouth 2 times daily   Quantity:  100 tablet   Refills:  0         CONTINUE these medicines which have NOT CHANGED        Dose / Directions    Prenatal Vitamin 27-0.8 MG Tabs        Refills:  0            Where to get your medicines      These medications were sent  to [facility] [place], [place] - [address]  [address], [facility] [address]     Phone:  [phone]     ibuprofen 800 MG tablet    lanolin ointment    senna-docusate 8.6-50 MG per tablet                Protect others around you: Learn how to safely use, store and throw away your medicines at www.disposemymeds.org.             Medication List: This is a list of all your medications and when to take them. Check marks below indicate your daily home schedule. Keep this list as a reference.      Medications           Morning Afternoon Evening Bedtime As Needed    ibuprofen 800 MG tablet   Commonly known as:  ADVIL/MOTRIN   Take 1 tablet (800 mg) by mouth every 6 hours as needed for other (cramping)   Last time this was given:  800 mg on 6/12/2018  6:47 AM                                lanolin ointment   Apply topically every hour as needed for dry skin (sore nipples)                                Prenatal Vitamin 27-0.8 MG Tabs                                senna-docusate 8.6-50 MG per tablet   Commonly known as:  SENOKOT-S;PERICOLACE   Take 1 tablet by mouth 2 times daily   Last time this was given:  2 tablets on 6/12/2018  7:33 AM

## 2018-06-09 NOTE — IP AVS SNAPSHOT
[facility]    [address] [facility] [address]    Phone:  [phone]                                       After Visit Summary   6/9/2018    Moon Ordoñez    MRN: [id number]           After Visit Summary Signature Page     I have received my discharge instructions, and my questions have been answered. I have discussed any challenges I see with this plan with the nurse or doctor.    ..........................................................................................................................................  Patient/Patient Representative Signature      ..........................................................................................................................................  Patient Representative Print Name and Relationship to Patient    ..................................................               ................................................  Date                                            Time    ..........................................................................................................................................  Reviewed by Signature/Title    ...................................................              ..............................................  Date                                                            Time

## 2018-06-09 NOTE — PLAN OF CARE
Problem: Patient Care Overview  Goal: Individualization & Mutuality  Data: Patient admitted to room 479 at 0840. Patient is a . Prenatal record reviewed.   Obstetric History       T0      L0     SAB0   TAB0   Ectopic0   Multiple0   Live Births0       # Outcome Date GA Lbr Basim/2nd Weight Sex Delivery Anes PTL Lv   1 Current               .  Medical History:   Past Medical History:   Diagnosis Date     NO ACTIVE PROBLEMS    .  Gestational age 39w3d. Vital signs per doc flowsheet. Fetal movement present. Patient reports Induction Of Labor   as reason for admission. Support persons ispresent.  Action:  Care of patient assumed at 0840. Verbal consent for EFM, external fetal monitors applied. Admission assessment completed. Patient and support persons educated on labor process. Patient instructed to report change in fetal movement, contractions, vaginal leaking of fluid or bleeding, abdominal pain, or any concerns related to the pregnancy to her nurse/physician. Patient oriented to room, call light in reach.   Response: RADHA Holt informed of patient arrival. Plan per provider is as ordered. Patient verbalized understanding of education and verbalized agreement with plan. Patient coping with labor via family support.

## 2018-06-09 NOTE — PROGRESS NOTES
"S: Doing well. Feeling some contractions and feeling like they are getting more intense and feeling them close together. They have been moving around well and using different things to help cope. Going for a walk now. No questions or concerns at this time. Discussed since she is getting more uncomfortable and marija frequently it is recommended to hold the next dose of cytotec. If her contractions slow down over the next couple of hours we can use the next dose otherwise her body will hopefully continue on its own.     O:  Blood pressure 134/75, temperature 98.4  F (36.9  C), temperature source Oral, resp. rate 16, height 1.727 m (5' 8\"), weight 78 kg (172 lb), last menstrual period 2017, unknown if currently breastfeeding.  General appearance: comfortable    CONTACTIONS: Contractions every 1-4 minutes.  Palpate: mild  FETAL HEART TONES: baseline 150 with moderate FHR variability and    accelerations yes. Decelerations no.    NST: REACTIVE  ROM: not ruptured  PELVIC EXAM: deferred  Fetal Position: cephalic   Bloody show: No  Pitocin- none,  Antibiotics- none  Cervical ripening: misoprostal    ASSESSMENT:  ==============  IUP @ 39w3d IOL for gestational hypertension    Fetal Heart rate tracing Category category one  GBS- negative     PLAN:  ===========  Cervical ripening with misoprostol  Pain medication if patient desires   Anticipate   Reevaluate in 2-4 hours/PRN     Francesca Holt CNM    "

## 2018-06-09 NOTE — LETTER
[facility] Postpartum Home Care Referral  [facility]  [address] [facility] [address]  Phone: [phone]    Date of Referral: 2018    Moon Ordoñez MRN# [id number]   Age: 37 year old YOB: 1980           Date of Admission:  2018  8:37 AM    Primary care provider: Marychuy Murphy  Attending Provider: Garima Bains APRN*    Payor: [facility] / Plan: [employer] / Product Type: PPO /          Pregnancy History:   The details of the mother's pregnancy are as follows:  OBSTETRIC HISTORY:  @[age@  EDC: Estimated Date of Delivery: 18  Obstetric History       T1      L1     SAB0   TAB0   Ectopic0   Multiple0   Live Births1       # Outcome Date GA Lbr Basim/2nd Weight Sex Delivery Anes PTL Lv   1 Term 06/10/18 39w4d 13:05 / 04:16 3.742 kg (8 lb 4 oz) F Vag-Spont EPI  BILLY      Name: PERCY VILLA      Complications: Prolonged PROM (>18 hours)      Apgar1:  9                Apgar5: 9          Prenatal Labs:   Lab Results   Component Value Date    ABO O 2018    RH Pos 2018    AS Neg 2018    HEPBANG Nonreactive 10/25/2017    CHPCRT  2006     Negative for C. trachomatis rRNA by transcription mediated amplification.    GCPCRT  2006     Negative for N. gonorrhoeae rRNA by transcription mediated amplification.    TREPAB Negative 10/25/2017    HGB 10.0 (L) 2018       GBS Status:  Lab Results   Component Value Date    GBS Negative 2018              Maternal History:   (NOTE - see maternal data and prenatal history report to review, select from baby index report)                      Family History:   This patient has no significant family history          Social History:   This  has no significant social history       Birth  History:     [place] Birth Information  Information for the patient's :  Percy Villa [[id number]]     Birth History      "Birth     Length: 0.521 m (1' 8.5\")     Weight: 3.742 kg (8 lb 4 oz)     HC 34.9 cm     Apgar     One: 9     Five: 9     Delivery Method: Vaginal, Spontaneous Delivery     Gestation Age: 39 4/7 wks     Duration of Labor: 1st: 13h 5m / 2nd: 4h 16m       Information for the patient's :  Percy Morales [[id number]]     Immunization History   Administered Date(s) Administered     Hep B, Peds or Adolescent 2018             Information     Feeding plan:   Information for the patient's :  Percy Moralesta [[id number]]           Latch:   Information for the patient's :  Percy Moralesta [[id number]]   8      Information for the patient's :  Percy Morales [[id number]]   Vitals  Heart Rate: 132  Heart Sounds: no murmur detected  Cardiac Regularity: Regular  Resp: 40  Temp: 98.4  F (36.9  C)  Temp src: Axillary      Information for the patient's :  Percy Morales [[id number]]         Information for the patient's :  Percy Morales [[id number]]   Weight: 3.6 kg (7 lb 15 oz)     Information for the patient's :  Percy Morales [[id number]]   Percent Weight Change Since Birth: -3.8       Information for the patient's :  Percy Morales [[id number]]   Hearing Screen Date: 18    Information for the patient's :  Percy Morales [[id number]]          Bilirubin Results:     Information for the patient's :  Percy Morales [[id number]]   No results for input(s): TCBIL, BILINEONATAL in the last 99137 hours.           Discharge Meds:      Moon Morales   Home Medication Instructions NIRAV:[id number]    Printed on:18 0828   Medication Information                      ibuprofen (ADVIL/MOTRIN) 800 MG tablet  Take 1 tablet (800 mg) by mouth every 6 hours as needed for other (cramping)             lanolin ointment  Apply " topically every hour as needed for dry skin (sore nipples)             Prenatal Vit-Fe Fumarate-FA (PRENATAL VITAMIN) 27-0.8 MG TABS               senna-docusate (SENOKOT-S;PERICOLACE) 8.6-50 MG per tablet  Take 1 tablet by mouth 2 times daily                 Information for the patient's :  Tristan Ordoñez, Baby1 Moon [[id number]]     There are no discharge medications for this patient.           Summary of Plan of Care:     Home Care to draw [place] Screen? No    Home Care Agency referred to: [facility]           Sybil Tinajero RN

## 2018-06-09 NOTE — H&P
ADMIT NOTE  =================  39w3d  Moon Ordoñez is a 37 year old female     with an Patient's last menstrual period was 2017. and Estimated Date of Delivery: Data Unavailable [facility] 2018 at 9:31 AM in for induction of labor.  Indication gestational hypertension.   Fetal movement- active  ROM- no   GBS- negative    HPI  ================  Patient and  Reyes presented today for IOL secondary to gestational hypertension. She had elevated blood pressure at her last two clinic visits. Very nervous about the IOL. Discussed process and recommendation to start with cytotec medication. Okay with this plan. Cervix /-1 intact.   FOB- is involved, Reyes  Other labor support- none     PRENATAL COURSE  =================  Prenatal course was   complicated by    Patient Active Problem List    Diagnosis Date Noted     Labor and delivery, indication for care 2018     Priority: Medium     Elevated blood pressure reading without diagnosis of hypertension 2018     Priority: Medium     2018 One elevated /90, protein creat 0.26, other labs WNL.  Returning this week for second BP check, consider another urine protein/creat level       AMA (advanced maternal age) multigravida 35+ 2017     Priority: Medium     FOB: Reyes    MFM first trimester screening ordered  17 M genetics, innatal drawn and WNL, needs AFP at 15-20 weeks, and comprehensive at 18 weeks   2018 MFM fetal survey WNL, no follow up recommended      Pap smear due PP       Need for Tdap vaccination 10/25/2017     Priority: Medium     Screening for malignant neoplasm of cervix 2015     Priority: Medium     Overview:   Hx of abnormal paps: no   NILM, hpv negative  Plan: Co-test 2020    ICD 10          HISTORIES  ============  No Known Allergies  Past Medical History:   Diagnosis Date     NO ACTIVE PROBLEMS      Past Surgical History:   Procedure Laterality Date      "NO HISTORY OF SURGERY     .  Family History   Problem Relation Age of Onset     OSTEOPOROSIS Mother      Hyperlipidemia Mother      Hyperlipidemia Father      Thyroid Cancer Sister      Colon Cancer Maternal Grandmother      CEREBROVASCULAR DISEASE Maternal Grandfather      Hyperlipidemia Paternal Grandfather      Social History   Substance Use Topics     Smoking status: Never Smoker     Smokeless tobacco: Never Used     Alcohol use No     Obstetric History       T0      L0     SAB0   TAB0   Ectopic0   Multiple0   Live Births0       # Outcome Date GA Lbr Basim/2nd Weight Sex Delivery Anes PTL Lv   1 Current                    LABS:   ===========  Rhogam not indicated  Lab Results   Component Value Date    ABO O 10/25/2017       Lab Results   Component Value Date    RH Pos 10/25/2017     No results found for: RUBELLAABIGG   Anti-Treponemia: negative   No results found for: HIV  Lab Results   Component Value Date    HGB 12.7 2018      Lab Results   Component Value Date    HEPBANG Nonreactive 10/25/2017     Lab Results   Component Value Date    GBS Negative 2018     1 hr GCT= 149  3 hr GTT= 82, 138, 100, 96    other labs- PI labs WNL including protein     ROS  =========  Pt denies significant respiratory, cardiovacular, GI, or muscular/skeletalcomplaints.    See RN data base ROS.     PHYSICAL EXAM:  ===============  Blood pressure 148/85, temperature 98.1  F (36.7  C), temperature source Oral, height 1.727 m (5' 8\"), weight 78 kg (172 lb), last menstrual period 2017, unknown if currently breastfeeding.  General appearance: comfortable  Heart: RRR without murmur  Lungs: clear to auscultation   Neuro: denies headache and visual disturbances  Psych: Mentation normal and bright   Legs: 2+/2+, no clonus, no edema      Abdomen: gravid, single vertex fetus, non-tender between contractions.  EFW-  7 lbs.   CONTACTIONS: occasional and random   FETAL HEART TONES: baseline 150 with moderate FHR " variability and  pos accelerations. No decelerations present.      PELVIC EXAM: 2/ 60/ Mid/ soft/ -1   VIDAL SCORE: 6  BLOODY SHOW: no   ROM: No  FLUID: none  AMNISURE: not done    ASSESSMENT:  ==============  IUP @ 39w3d in for induction of labor.  Indication gestational hypertension    NST REACTIVE  Fetal Heart rate tracing Category category one  GBS- negative     PLAN:  ===========  Admit - see IP orders  Observation  Cervical ripening with misoprostol  Pain medication if patient desires   Anticipate      DORINDA Nevarez CNM

## 2018-06-10 ENCOUNTER — ANESTHESIA (OUTPATIENT)
Dept: OBGYN | Facility: CLINIC | Age: 38
End: 2018-06-10
Payer: COMMERCIAL

## 2018-06-10 ENCOUNTER — ANESTHESIA EVENT (OUTPATIENT)
Dept: OBGYN | Facility: CLINIC | Age: 38
End: 2018-06-10
Payer: COMMERCIAL

## 2018-06-10 PROCEDURE — 25000125 ZZHC RX 250: Performed by: ANESTHESIOLOGY

## 2018-06-10 PROCEDURE — 25000128 H RX IP 250 OP 636: Performed by: ANESTHESIOLOGY

## 2018-06-10 PROCEDURE — 3E0P7VZ INTRODUCTION OF HORMONE INTO FEMALE REPRODUCTIVE, VIA NATURAL OR ARTIFICIAL OPENING: ICD-10-PCS | Performed by: OBSTETRICS & GYNECOLOGY

## 2018-06-10 PROCEDURE — 12000032 ZZH R&B OB CRITICAL UMMC

## 2018-06-10 PROCEDURE — 00HU33Z INSERTION OF INFUSION DEVICE INTO SPINAL CANAL, PERCUTANEOUS APPROACH: ICD-10-PCS | Performed by: OBSTETRICS & GYNECOLOGY

## 2018-06-10 PROCEDURE — 0DQR0ZZ REPAIR ANAL SPHINCTER, OPEN APPROACH: ICD-10-PCS | Performed by: OBSTETRICS & GYNECOLOGY

## 2018-06-10 PROCEDURE — 25000132 ZZH RX MED GY IP 250 OP 250 PS 637: Performed by: ADVANCED PRACTICE MIDWIFE

## 2018-06-10 PROCEDURE — 72200001 ZZH LABOR CARE VAGINAL DELIVERY SINGLE

## 2018-06-10 PROCEDURE — 3E0S3BZ INTRODUCTION OF ANESTHETIC AGENT INTO EPIDURAL SPACE, PERCUTANEOUS APPROACH: ICD-10-PCS | Performed by: OBSTETRICS & GYNECOLOGY

## 2018-06-10 PROCEDURE — 25000128 H RX IP 250 OP 636

## 2018-06-10 PROCEDURE — 25000128 H RX IP 250 OP 636: Performed by: ADVANCED PRACTICE MIDWIFE

## 2018-06-10 PROCEDURE — 59400 OBSTETRICAL CARE: CPT | Mod: 22 | Performed by: OBSTETRICS & GYNECOLOGY

## 2018-06-10 PROCEDURE — 25000125 ZZHC RX 250: Performed by: ADVANCED PRACTICE MIDWIFE

## 2018-06-10 PROCEDURE — 3E033VJ INTRODUCTION OF OTHER HORMONE INTO PERIPHERAL VEIN, PERCUTANEOUS APPROACH: ICD-10-PCS | Performed by: OBSTETRICS & GYNECOLOGY

## 2018-06-10 RX ORDER — TERBUTALINE SULFATE 1 MG/ML
0.25 INJECTION, SOLUTION SUBCUTANEOUS
Status: DISCONTINUED | OUTPATIENT
Start: 2018-06-10 | End: 2018-06-10

## 2018-06-10 RX ORDER — IBUPROFEN 800 MG/1
800 TABLET, FILM COATED ORAL EVERY 6 HOURS PRN
Status: DISCONTINUED | OUTPATIENT
Start: 2018-06-10 | End: 2018-06-12 | Stop reason: HOSPADM

## 2018-06-10 RX ORDER — OXYTOCIN/0.9 % SODIUM CHLORIDE 30/500 ML
100 PLASTIC BAG, INJECTION (ML) INTRAVENOUS CONTINUOUS
Status: DISCONTINUED | OUTPATIENT
Start: 2018-06-10 | End: 2018-06-12 | Stop reason: HOSPADM

## 2018-06-10 RX ORDER — OXYTOCIN/0.9 % SODIUM CHLORIDE 30/500 ML
340 PLASTIC BAG, INJECTION (ML) INTRAVENOUS CONTINUOUS PRN
Status: DISCONTINUED | OUTPATIENT
Start: 2018-06-10 | End: 2018-06-12 | Stop reason: HOSPADM

## 2018-06-10 RX ORDER — FENTANYL/BUPIVACAINE/NS/PF 2-1250MCG
10 PLASTIC BAG, INJECTION (ML) INJECTION CONTINUOUS
Status: DISCONTINUED | OUTPATIENT
Start: 2018-06-10 | End: 2018-06-10

## 2018-06-10 RX ORDER — AMOXICILLIN 250 MG
2 CAPSULE ORAL 2 TIMES DAILY
Status: DISCONTINUED | OUTPATIENT
Start: 2018-06-10 | End: 2018-06-12 | Stop reason: HOSPADM

## 2018-06-10 RX ORDER — LIDOCAINE 40 MG/G
CREAM TOPICAL
Status: DISCONTINUED | OUTPATIENT
Start: 2018-06-10 | End: 2018-06-10

## 2018-06-10 RX ORDER — OXYTOCIN 10 [USP'U]/ML
10 INJECTION, SOLUTION INTRAMUSCULAR; INTRAVENOUS
Status: DISCONTINUED | OUTPATIENT
Start: 2018-06-10 | End: 2018-06-12 | Stop reason: HOSPADM

## 2018-06-10 RX ORDER — NALOXONE HYDROCHLORIDE 0.4 MG/ML
.1-.4 INJECTION, SOLUTION INTRAMUSCULAR; INTRAVENOUS; SUBCUTANEOUS
Status: DISCONTINUED | OUTPATIENT
Start: 2018-06-10 | End: 2018-06-12 | Stop reason: HOSPADM

## 2018-06-10 RX ORDER — MISOPROSTOL 200 UG/1
400 TABLET ORAL
Status: DISCONTINUED | OUTPATIENT
Start: 2018-06-10 | End: 2018-06-12 | Stop reason: HOSPADM

## 2018-06-10 RX ORDER — BISACODYL 10 MG
10 SUPPOSITORY, RECTAL RECTAL DAILY PRN
Status: DISCONTINUED | OUTPATIENT
Start: 2018-06-12 | End: 2018-06-12 | Stop reason: HOSPADM

## 2018-06-10 RX ORDER — OXYCODONE HYDROCHLORIDE 5 MG/1
5 TABLET ORAL EVERY 4 HOURS PRN
Status: DISCONTINUED | OUTPATIENT
Start: 2018-06-10 | End: 2018-06-12 | Stop reason: HOSPADM

## 2018-06-10 RX ORDER — OXYTOCIN/0.9 % SODIUM CHLORIDE 30/500 ML
1-24 PLASTIC BAG, INJECTION (ML) INTRAVENOUS CONTINUOUS
Status: DISCONTINUED | OUTPATIENT
Start: 2018-06-10 | End: 2018-06-10

## 2018-06-10 RX ORDER — LANOLIN 100 %
OINTMENT (GRAM) TOPICAL
Status: DISCONTINUED | OUTPATIENT
Start: 2018-06-10 | End: 2018-06-12 | Stop reason: HOSPADM

## 2018-06-10 RX ORDER — ACETAMINOPHEN 325 MG/1
650 TABLET ORAL EVERY 4 HOURS PRN
Status: DISCONTINUED | OUTPATIENT
Start: 2018-06-10 | End: 2018-06-12 | Stop reason: HOSPADM

## 2018-06-10 RX ORDER — NALOXONE HYDROCHLORIDE 0.4 MG/ML
.1-.4 INJECTION, SOLUTION INTRAMUSCULAR; INTRAVENOUS; SUBCUTANEOUS
Status: DISCONTINUED | OUTPATIENT
Start: 2018-06-10 | End: 2018-06-10

## 2018-06-10 RX ORDER — FENTANYL/BUPIVACAINE/NS/PF 2-1250MCG
PLASTIC BAG, INJECTION (ML) INJECTION
Status: COMPLETED
Start: 2018-06-10 | End: 2018-06-10

## 2018-06-10 RX ORDER — AMOXICILLIN 250 MG
1 CAPSULE ORAL 2 TIMES DAILY
Status: DISCONTINUED | OUTPATIENT
Start: 2018-06-10 | End: 2018-06-12 | Stop reason: HOSPADM

## 2018-06-10 RX ORDER — NALBUPHINE HYDROCHLORIDE 10 MG/ML
2.5-5 INJECTION, SOLUTION INTRAMUSCULAR; INTRAVENOUS; SUBCUTANEOUS EVERY 6 HOURS PRN
Status: DISCONTINUED | OUTPATIENT
Start: 2018-06-10 | End: 2018-06-10

## 2018-06-10 RX ORDER — LIDOCAINE HYDROCHLORIDE AND EPINEPHRINE 15; 5 MG/ML; UG/ML
INJECTION, SOLUTION EPIDURAL PRN
Status: DISCONTINUED | OUTPATIENT
Start: 2018-06-10 | End: 2018-06-11 | Stop reason: HOSPADM

## 2018-06-10 RX ORDER — EPHEDRINE SULFATE 50 MG/ML
5 INJECTION, SOLUTION INTRAMUSCULAR; INTRAVENOUS; SUBCUTANEOUS
Status: DISCONTINUED | OUTPATIENT
Start: 2018-06-10 | End: 2018-06-10

## 2018-06-10 RX ORDER — HYDROCORTISONE 2.5 %
CREAM (GRAM) TOPICAL 3 TIMES DAILY PRN
Status: DISCONTINUED | OUTPATIENT
Start: 2018-06-10 | End: 2018-06-12 | Stop reason: HOSPADM

## 2018-06-10 RX ADMIN — SODIUM CHLORIDE, POTASSIUM CHLORIDE, SODIUM LACTATE AND CALCIUM CHLORIDE: 600; 310; 30; 20 INJECTION, SOLUTION INTRAVENOUS at 10:56

## 2018-06-10 RX ADMIN — LIDOCAINE HYDROCHLORIDE,EPINEPHRINE BITARTRATE 3 ML: 15; .005 INJECTION, SOLUTION EPIDURAL; INFILTRATION; INTRACAUDAL; PERINEURAL at 09:08

## 2018-06-10 RX ADMIN — ONDANSETRON 4 MG: 2 INJECTION INTRAMUSCULAR; INTRAVENOUS at 06:02

## 2018-06-10 RX ADMIN — Medication 10 ML/HR: at 15:36

## 2018-06-10 RX ADMIN — Medication: at 15:36

## 2018-06-10 RX ADMIN — SODIUM CHLORIDE, POTASSIUM CHLORIDE, SODIUM LACTATE AND CALCIUM CHLORIDE: 600; 310; 30; 20 INJECTION, SOLUTION INTRAVENOUS at 06:06

## 2018-06-10 RX ADMIN — Medication 10 ML/HR: at 10:57

## 2018-06-10 RX ADMIN — SODIUM CHLORIDE, POTASSIUM CHLORIDE, SODIUM LACTATE AND CALCIUM CHLORIDE 250 ML: 600; 310; 30; 20 INJECTION, SOLUTION INTRAVENOUS at 12:19

## 2018-06-10 RX ADMIN — METHYLERGONOVINE MALEATE 400 MCG: 0.2 INJECTION INTRAMUSCULAR; INTRAVENOUS at 15:35

## 2018-06-10 RX ADMIN — Medication: at 10:00

## 2018-06-10 RX ADMIN — IBUPROFEN 800 MG: 800 TABLET, FILM COATED ORAL at 18:02

## 2018-06-10 RX ADMIN — LIDOCAINE HYDROCHLORIDE 20 ML: 10 INJECTION, SOLUTION INFILTRATION; PERINEURAL at 15:34

## 2018-06-10 RX ADMIN — IBUPROFEN 800 MG: 800 TABLET, FILM COATED ORAL at 23:44

## 2018-06-10 RX ADMIN — OXYTOCIN-SODIUM CHLORIDE 0.9% IV SOLN 30 UNIT/500ML 2 MILLI-UNITS/MIN: 30-0.9/5 SOLUTION at 06:05

## 2018-06-10 NOTE — PROGRESS NOTES
"S: Patient is doing well. Feeling the urge to push and requesting to be checked. Cervix 9/80/-1 with clear fluid. Patient is marija about every 4-6 minutes. Discussed it would be recommended at this time to use pitocin to help with progression and the pushing stage. She feels like she needs the breaks now to get through the labor. Will try position change as well to see if that will help. Will let us know if she changes her mind. No other questions or concerns at this time. Coping well and  at bedside and supportive.     O:  Blood pressure 139/76, temperature 98  F (36.7  C), temperature source Oral, resp. rate 16, height 1.727 m (5' 8\"), weight 78 kg (172 lb), last menstrual period 2017, unknown if currently breastfeeding.  General appearance: uncomfortable with contractions    CONTACTIONS: Contractions every 4-6 minutes.  Palpate: moderate  FETAL HEART TONES: baseline 140 with moderate FHR variability and    accelerations no. Decelerations no.    NST: REACTIVE  ROM: clear fluid  PELVIC EXAM:PELVIC EXAM: 9 80/ Mid/ soft/ -1   Fetal Position: cephalic   Bloody show: Yes   Pitocin- none,  Antibiotics- none  Cervical ripening: N/A    ASSESSMENT:  ==============  IUP @ 39w4d active labor, minimal progress   Fetal Heart rate tracing Category category one  GBS- negative     PLAN:  ===========  Pain medication with nitrous   Anticipate   Labor augmentation with Pitocin when/if patient is ready   Reevaluate in 2-4 hours/PRN     Francesca Holt CNM    "

## 2018-06-10 NOTE — L&D DELIVERY NOTE
Delivery Summary    Stage 1:  37 year old  presented at 39 weeks 3 days with induction of labor for gestational hypertension. GBS negative. She received one dose of vaginal  misoprostol then progressed in labor. SROM occurred. She progressed to anterior lip and progress stopped and contraction spaced out. Pitocin was recommend and ultimately accepted. She received epidural anesthesia. She progressed to complete.     Stage 2:  Patient did labor down. She pushed effectively but slow progress was made. Meconium was present. Baby was confirmed to be in ROP position and manual rotation was attempted. She continue to push effectively and made progress. I was asked by CNM to evaluate and assist with progress of the second stage.  She delivered a viable female infant with Apgars 9/9 over a third degree laceration. Weight is pending.   Delayed cord clamping was performed. Cord was clamped and cut.  NICU was called to delivery but patient suddenly progressed quickly. Baby cried vigorously immediately after delivery and was doing well when NICU team arrived.     Stage 3:  Placenta delivered rapidly with active management, intact, three vessel cord.   After infiltrating with 1% lidocaine third degree laceration was repaired. PISA approach was used to approximate sphincter muscle with 0-vicryl in interrupted fashion. Sulcal lacerations were repaired with 3-0 vicryl and joined at the hymeneal ring. Second degree laceration repair was completed with 3-0 vicryl in standard fashion.  QBL pending.  Sponge and needle counts correct.    Expected date of discharge: 18    MD Moon Fisher Chary Tristan Ordoñez MRN# [id number]   Age: 37 year old YOB: 1980     Labor Event Times:    Labor Onset Date       Labor Onset Time    Dilation Complete Date    Dilation Complete Time       Start Pushing Date        Start Pushing Time            Labor Length:    1st Stage (hrs/min) 13.00 5.00   2nd Stage (hrs/min)  4.00 16.00   3rd Stage (hrs/min) 0.00 5.00       Labor Events:     Labor    Rupture Date     Rupture Time     Rupture Type Spontaneous rupture of membranes occuring during spontaneous labor or augmentation   Fluid Color     Labor Type     Induction    Induction Indication         Augmentation    Labor Complications     Additional Complications     Management of Labor        Antibiotics     IV Antibiotic Given     Additional Management     Fetal Status Prior to  Delivery     Fetal Status Comments         Cervical Ripening:    Date     Time     Type         Delivery:    Episiotomy None   Local Anesthetic        Lacerations 3rd   Sponge Count Correct       Needle Count Correct     Final Count by:    Sutures     Blood loss (ml)    Packing Intentionally Left In     Number     Comments           Information for the patient's :  Percy Morales [[id number]]       Delivery  6/10/2018 3:21 PM by  Vaginal, Spontaneous Delivery  Sex:  female Gestational Age: 39w4d  Delivery Clinician:     Living?:            APGARS  One minute Five minutes Ten minutes   Skin color:            Heart rate:            Grimace:            Muscle tone:            Breathing:            Totals: 9  9         Presentation/position:           Resuscitation and Interventions: Method:  None  Oxygen Type:     Intubation Time:   # of Attempts:     ETT Size:        Tracheal Suction:     Tracheal returns:       Care at Delivery:  Called to delivery by Dr. Corinne Deng for meconium stained amniotic fluid in term infant. Infant had delivered prior to NICU team's arrival and was vigorous on mother's abdomen. NICU team dismissed by postpartum care team. Apgar scores to be assigne  d by postpartum RN.   Sybil Hayes, APRN, CNP, NNP-BC 6/10/2018 3:30 PM    Infant dried with blankets, placed to mom's chest. Infant vigorous.         Cord information:     Disposition of cord blood:      Blood gases sent?    Complications:      Placenta: Delivered:           appearance.  Comments:  .  Disposition: Hospital disposal   Measurements:  Weight:    Height:    Head circumference:    Chest circumference:     Temperature:     Other providers:       Additional  information:  Forceps:    Verbal Informed Consent Obtained:       Alternative Labor Strategies Discussed:     Emergency Resources Available:       Type:       Accrued Pulling Time:       # of Pulls:      Position:     Fetal Station:       Indications:      Other Indications:     Operative Vaginal Delivery Brief Note Forceps:        Vacuum:    Verbal Informed Consent Obtained:     Alternative Labor Strategies Discussed:     Emergency Resources Available:     Type:      Accrued Pulling Time:       # of Pop-Offs:       # of Pulls:       Position:     Fetal Station:      Indications for Vacuum:       Other Indications:    Operative Vaginal Delivery Brief Note Vacuum:        Shoulder Dystocia Shoulder Dystocia    Fetal Tracing Prior to Delivery:  Category 1   Fetal Tracing Comments:  Category 2 for an interval while pushing   Shoulder dystocia present?:  No                                            Breech:       : Type:     Indications for Primary:     Indications for Secondary:     Other Indications:        Observed anomalies     Output in Delivery Room:

## 2018-06-10 NOTE — PLAN OF CARE
Problem: Patient Care Overview  Goal: Plan of Care/Patient Progress Review  Outcome: Improving  Labor Shift Note  Data: Contraction frequency q 2-5 minutes, palpate moderate. Fetal assessment category one. Leaking moderate amounts of clear fluid.  Signs and symptoms of infection absent.  Blood pressures 140's/80s. Signs and symptoms of pre-eclampsia: absent.  Support person Reyes present.  Interventions: Continue uterine/fetal assessment continuous. Vital Signs per order set. Comfort measures music therapy, support person, birthing ball.  Medicated for none.  Plan: Anticipate . Provide labor/coping assistance as needed by patient and support person.  Observe for and notify care provider of indications of progressing labor, need for pain medications,  or signs of fetal/maternal compromise.

## 2018-06-10 NOTE — PROGRESS NOTES
[facility] DELIVERY PROGRESS NOTE:   Mariangel 10, 2018   1:39 PM         SUBJECTIVE:   Patient c/o fatigue.  Called to evaluate fetal position.  Contractions:  q 3 minutes  Leakage of fluid:  Yes: thin meconium  Vaginal bleeding:  No  Pain controlled:  Yes           OBJECTIVE:     Vitals:    06/10/18 1028 06/10/18 1107 06/10/18 1140 06/10/18 1238   BP: 134/74 128/88 147/78 158/73   Resp:       Temp:       TempSrc:       SpO2: 93%  (!) 86%    Weight:       Height:             NST:  Fetal Heart Rate Tracing:   Baseline: 140  Variability: Moderate  Accels, variable decels    Tocometer: q 3 minutes    Abdomen:  Gravid, NT  Cervix:   Dilation: 10   Effacement: 100%   Station:+1   Consistency: na   Position: ROP           LABS:   No results found for this or any previous visit (from the past 12 hour(s)).           ASSESSMENT:   37 year old  at 39w4d   induction of labor, indication gestational hypertension  ROP position  Meconium         PLAN:   ROP confirmed with ultrasound  Discussed and attempted manual rotation  Will resume pushing efforts   Not amenable to vacuum assistance current given fetal position and station.    Corinne Deng MD

## 2018-06-10 NOTE — PLAN OF CARE
Problem: Patient Care Overview  Goal: Individualization & Mutuality  Labor is not progressing as expected. Patient is now comfortable under epidural anesthesia. See flow sheet for fetal and uterine monitoring. Will continue to monitor labor and notify the provider with changes.

## 2018-06-10 NOTE — PLAN OF CARE
Problem: Patient Care Overview  Goal: Plan of Care/Patient Progress Review  Outcome: Improving  Labor Shift Note  Data: Contraction frequency q 4-6 minutes, palpate moderate. Fetal assessment category one.   Vitals:    18 1925 18 2240 06/10/18 0400 06/10/18 0605   BP: 144/75 143/85 139/76 129/73   Resp: 16 16 16 16   Temp: 98  F (36.7  C) 98  F (36.7  C) 97.8  F (36.6  C)    TempSrc: Oral Oral Oral    Weight:       Height:       .  .  Leaking small amounts of clear fluid.  Signs and symptoms of infection absent.  Blood pressures WNL. Signs and symptoms of pre-eclampsia: absent.  Support person Reyes present.  Interventions: Continue uterine/fetal assessment continuous. Vital Signs per order set. Comfort measures support person, music therapy, nitrous oxide.  Medicated for nitrous and zofran. Pitocin initiated at 0605.  Plan: Anticipate . Provide labor/coping assistance as needed by patient and support person.  Observe for and notify care provider of indications of progressing labor, need for pain medications,  or signs of fetal/maternal compromise.

## 2018-06-10 NOTE — PROGRESS NOTES
"  S:Assumed care of patient from RADHA Askew at 0700. Pt using nitrous for pain relief, breathing through contractions.  at bedside.     O:  Blood pressure 149/79, temperature 97.8  F (36.6  C), temperature source Oral, resp. rate 16, height 1.727 m (5' 8\"), weight 78 kg (172 lb), last menstrual period 2017, unknown if currently breastfeeding.  General appearance: uncomfortable with contractions, tired  CONTRACTIONS: Contractions every 3-8 minutes.  Palpate: moderate  FETAL HEART TONES: baseline 135 with moderate FHR variability and    accelerations yes. Decelerations no.    NST: REACTIVE  ROM: clear fluid  PELVIC EXAM:deferred  Fetal Position:  vertex  Pitocin- 5 mu/min.,  Antibiotics- none    ASSESSMENT:  ==============  IUP @ 39w4d active labor, dysfunctional labor  Pitocin induction after one dose of Miso   Fetal Heart rate tracing category one  GBS- negative     PLAN:  ===========  Continue to titrate pitocin per unit protocol  Bedside labor support  Anticipate   Garima Bains CNM  "

## 2018-06-10 NOTE — PROGRESS NOTES
"  S:Patient comfortable. RN straight cathed and checked her cervix - C/C/+1 with a bulging bag. Pt agrees to AROM. Water broke with cervical exam for clear fluid. Pt instructed on pushing and started pushing at 1145.    O:  Blood pressure 128/88, temperature 97.8  F (36.6  C), temperature source Oral, resp. rate 16, height 1.727 m (5' 8\"), weight 78 kg (172 lb), last menstrual period 2017, SpO2 93 %, unknown if currently breastfeeding.  General appearance: comfortable    CONTRACTIONS: Contractions every 2-3 minutes.  Palpate: moderate  FETAL HEART TONES: baseline 135 with moderate FHR variability and    accelerations yes. Decelerations yes - variables with pushing.    ROM: clear fluid  PELVIC EXAM:10/100/+1  Fetal Position:  vertex  Bloody show: No  Pitocin- 9 mu/min.,  Antibiotics- none    ASSESSMENT:  ==============  IUP @ 39w4d second stage labor, IOL for gestational hypertension  Fetal Heart rate tracing category two  GBS- negative  Pitocin induction     PLAN:  ===========  Continued bedside support  Anticipate    Garima Bains CNM  "

## 2018-06-10 NOTE — PROGRESS NOTES
"S: Patient is doing well. Having a harder time coping with contractions now and not feeling much progress. Wants to discuss pitocin again, discussed recommendation to help with contractions and to help with the pushing stage. She is concerned about the discomfort from the pitocin. Discussed pain medication options. They agree to start pitocin. No other questions or concerns at this time.     O:  Blood pressure 139/76, temperature 97.8  F (36.6  C), temperature source Oral, resp. rate 16, height 1.727 m (5' 8\"), weight 78 kg (172 lb), last menstrual period 2017, unknown if currently breastfeeding.  General appearance: uncomfortable with contractions    CONTACTIONS: Contractions every 4-8 minutes.  Palpate: moderate  FETAL HEART TONES: baseline 140 with moderate FHR variability and    accelerations yes. Decelerations occasional early decelerations.    NST: REACTIVE  ROM: clear fluid  PELVIC EXAM: deferred  Fetal Position: cephalic   Bloody show: Yes   Pitocin- none,  Antibiotics- none  Cervical ripening: N/A    ASSESSMENT:  ==============  IUP @ 39w4d active labor and minimal/no progress   Fetal Heart rate tracing Category category one  GBS- negative     PLAN:  ===========  Pain medication with nitrous   Anticipate   Labor augmentation with Pitocin  Reevaluate in 2-4 hours/PRN     Francesca Holt CNM    "

## 2018-06-10 NOTE — PROGRESS NOTES
"S: Patient is doing well. Coping well with contractions with support from . They have no questions or concerns. Wanting to be checked now, /-1 with bulging bag. Discussed other options for positions to help with labor but feels good with where she is at. Using nitrous and liking the way it is working.     O:  Blood pressure 143/85, temperature 98  F (36.7  C), temperature source Oral, resp. rate 16, height 1.727 m (5' 8\"), weight 78 kg (172 lb), last menstrual period 2017, unknown if currently breastfeeding.  General appearance: uncomfortable with contractions    CONTACTIONS: Contractions every 1-5 minutes.  Palpate: moderate  FETAL HEART TONES: baseline 140 with moderate FHR variability and    accelerations yes. Decelerations no.    NST: REACTIVE  ROM: clear fluid  PELVIC EXAM:PELVIC EXAM: / Mid/ soft/ -1   Fetal Position: cephalic   Bloody show: Yes   Pitocin- none,  Antibiotics- none  Cervical ripening: N/A    ASSESSMENT:  ==============  IUP @ 39w4d active labor   Fetal Heart rate tracing Category category one  GBS- negative     PLAN:  ===========  Pain medication with nitrous   Anticipate   Reevaluate in 2-4 hours/PRN     Francesca Holt CNM    "

## 2018-06-10 NOTE — PROGRESS NOTES
"  S:Patient has been pushing now for 1.5 hours. Progress made over the first 30 minutes but not a lot over the following hour. She is pushing well.     O:  Blood pressure 158/73, temperature 97.8  F (36.6  C), temperature source Oral, resp. rate 16, height 1.727 m (5' 8\"), weight 78 kg (172 lb), last menstrual period 09/06/2017, SpO2 (!) 86 %, unknown if currently breastfeeding.  General appearance: comfortable    CONTRACTIONS: Contractions every 2-3 minutes.  Palpate: moderate  FETAL HEART TONES: baseline 135 with moderate FHR variability and    accelerations - no. Decelerations yes - recurrent late variables with contractions.    ROM: moderate meconium fluid  PELVIC EXAM:10/100/+2  Fetal Position: Asynclitic, MAIRA?  Pitocin- 10 mu/min    ASSESSMENT:  ==============  IUP @ 39w4d second stage labor, IOL for gestational hypertension  Fetal Heart rate tracing Category two  GBS- negative  Pitocin induction  Epidural anesthesia     PLAN:  ===========  Discussed situation with  and patient. Explained that I think baby is asynclitic and that I will call Dr Deng to assess for vacuum vs manual rotation. Pt and  agree to this.    RN to try rebozo until Dr Deng to room  Garima Bains CNM  "

## 2018-06-10 NOTE — ANESTHESIA PREPROCEDURE EVALUATION
Anesthesia Evaluation       history and physical reviewed .      No history of anesthetic complications          ROS/MED HX    ENT/Pulmonary:  - neg pulmonary ROS     Neurologic:  - neg neurologic ROS     Cardiovascular:     (+) --PIH, --. : . . . :. .       METS/Exercise Tolerance:     Hematologic:         Musculoskeletal:         GI/Hepatic:  - neg GI/hepatic ROS       Renal/Genitourinary:         Endo:         Psychiatric:         Infectious Disease:         Malignancy:         Other:                     Physical Exam  Normal systems: cardiovascular and pulmonary    Airway     Dental     Cardiovascular       Pulmonary     Other findings: 36 yo F  @ 39w4d IOL for gestational hypertension requesting labor epidural      neg OB ROS                 Anesthesia Plan      History & Physical Review      ASA Status:  .  OB Epidural Asa: 2            Postoperative Care      Consents  Anesthetic plan, risks, benefits and alternatives discussed with:  Patient..

## 2018-06-10 NOTE — PROGRESS NOTES
"S: Patient is doing well. She is coping well with contractions. She reports they are getting stronger although they are spacing a little more now. She feels good about getting the rest between. She is using nitrous to help cope. They have no questions or concerns at this time. Discussed if the contractions continue to be further apart we can discuss other options to help. She is ruptured with clear fluid.     O:  Blood pressure 143/85, temperature 98  F (36.7  C), temperature source Oral, resp. rate 16, height 1.727 m (5' 8\"), weight 78 kg (172 lb), last menstrual period 2017, unknown if currently breastfeeding.  General appearance: uncomfortable with contractions    CONTACTIONS: Contractions every 3-6 minutes.  Palpate: moderate  FETAL HEART TONES: baseline 155 with moderate FHR variability and    accelerations yes. Decelerations no.    NST: REACTIVE  ROM: clear fluid  PELVIC EXAM: deferred  Fetal Position: cephalic   Bloody show: Yes   Pitocin- none,  Antibiotics- none  Cervical ripening: N/A    ASSESSMENT:  ==============  IUP @ 39w3d IOL for gestational hypertension   Fetal Heart rate tracing Category category one  GBS- negative     PLAN:  ===========  Pain medication with nitrous oxide   Anticipate   Labor augmentation with Pitocin if needed  Reevaluate in 2-4 hours/PRN     Francesca Holt CNM    "

## 2018-06-10 NOTE — PLAN OF CARE
Problem: Patient Care Overview  Goal: Individualization & Mutuality  Outcome: Therapy, progress toward functional goals is gradual  The labor is fairlly progressing. See flow sheet for fetal and uterine monitoring. Patient has been pushing since 1140. Will continue  pushing with provider order.

## 2018-06-10 NOTE — PROGRESS NOTES
"  S:Patient requested epidural. Now comfortable and resting.     O:  Blood pressure 130/69, temperature 97.8  F (36.6  C), temperature source Oral, resp. rate 16, height 1.727 m (5' 8\"), weight 78 kg (172 lb), last menstrual period 09/06/2017, SpO2 95 %, unknown if currently breastfeeding.  General appearance: comfortable    CONTRACTIONS: Contractions every 3-4 minutes.  Palpate: moderate  FETAL HEART TONES: baseline 135 with moderate FHR variability and    accelerations yes. Decelerations no.    ROM: clear fluid  PELVIC EXAM:deferred  Pitocin- 9 mu/min.,  Antibiotics- none    ASSESSMENT:  ==============  IUP @ 39w4d active labor   Dysfunctional labor with no progress over 4+ hours  IOL for gestational hypertension  Fetal Heart rate tracing category one  GBS- negative  Pitocin induction     PLAN:  ===========  Continue pitocin per unit protocol   Dr Deng aware of patient status and available prn  Reevaluate in 2-4 hours after patient has been able to rest  Garima Bains CNM  "

## 2018-06-10 NOTE — PLAN OF CARE
Problem: Patient Care Overview  Goal: Individualization & Mutuality  Outcome: Therapy, progress toward functional goals as expected  Data: Moon Ordoñez transferred to [facility] via wheelchair at 1810. Baby transferred via parent's arms.  Action: Receiving unit notified of transfer: Yes. Patient and family notified of room change. Report given to Alice at 1820. Belongings sent to receiving unit. Accompanied by Registered Nurse. Oriented patient to surroundings. Call light within reach. ID bands double-checked with receiving RN.  Response: Patient tolerated transfer and is stable.

## 2018-06-10 NOTE — ANESTHESIA PROCEDURE NOTES
Epidural Procedure Note    Staff:     Anesthesiologist:  DEVONTE RAMIREZ  Location: OB   Pre-procedure checklist:   patient identified, IV checked, site marked, risks and benefits discussed, informed consent, monitors and equipment checked, pre-op evaluation, at physician/surgeon's request and post-op pain management      Correct Patient: Yes      Correct Position: Yes      Correct Site: Yes      Correct Procedure: Yes      Correct Laterality:  N/A    Site Marked:  N/A  Procedure:     Procedure:  Epidural catheter    ASA:  2    Diagnosis:  Iup    Position:  Sitting    Sterile Prep: chloraprep      Insertion site:  L4-5    Local skin infiltration:  1% lidocaine    amount (mL):  2    Approach:  Midline    Needle gauge (G):  17    Needle Length (in):  3.5    Block Needle Type:  Touhy    Injection Technique:  LORT saline    MARYBETH at (cm):  5    Attempts:  1    Redirects:  0    Catheter gauge (G):  20    Catheter threaded easily: Yes      Threaded to cm at skin:  9    Threaded in epidural space (cm):  4    Paresthesias:  No    Aspiration negative for Heme or CSF: Yes      Test dose (mL):  3     Local anesthetic:  Lidocaine 1.5% w/ 1:200,000 epinephrine    Test dose negative for signs of intravascular, subdural or intrathecal injection: Yes

## 2018-06-10 NOTE — PROGRESS NOTES
"S: Patient is doing well. Feeling more uncomfortable and noticing more bloody show. Cervix 5/70/-1 intact. Shazia well after only one dose of cytotec. Coping well with the support of . They have no questions or concerns at this time. She has been trying to rest between contractions.     O:  Blood pressure 129/76, temperature 97.8  F (36.6  C), temperature source Oral, resp. rate 18, height 1.727 m (5' 8\"), weight 78 kg (172 lb), last menstrual period 2017, unknown if currently breastfeeding.  General appearance: uncomfortable with contractions    CONTACTIONS: Contractions every 2-4 minutes.  Palpate: moderate  FETAL HEART TONES: baseline 140 with moderate FHR variability and    accelerations yes. Decelerations no.    NST: REACTIVE  ROM: not ruptured  PELVIC EXAM:PELVIC EXAM: 5/ Mid/ soft/ -1   Fetal Position: cephalic   Bloody show: Yes   Pitocin- none,  Antibiotics- none  Cervical ripening: N/A    ASSESSMENT:  ==============  IUP @ 39w3d IOL for gestational hypertension   Fetal Heart rate tracing Category category one  GBS- negative     PLAN:  ===========  Pain medication if patient desires   Anticipate   Reevaluate in 2-4 hours/PRN   Labor augmentation if needed     Francesca Holt CNM    "

## 2018-06-11 PROBLEM — Z34.00 SUPERVISION OF NORMAL FIRST PREGNANCY: Status: RESOLVED | Noted: 2017-11-17 | Resolved: 2017-11-17

## 2018-06-11 LAB
HGB BLD-MCNC: 10 G/DL (ref 11.7–15.7)
T PALLIDUM AB SER QL: NONREACTIVE

## 2018-06-11 PROCEDURE — 25000132 ZZH RX MED GY IP 250 OP 250 PS 637: Performed by: ADVANCED PRACTICE MIDWIFE

## 2018-06-11 PROCEDURE — 36415 COLL VENOUS BLD VENIPUNCTURE: CPT | Performed by: ADVANCED PRACTICE MIDWIFE

## 2018-06-11 PROCEDURE — 12000028 ZZH R&B OB UMMC

## 2018-06-11 PROCEDURE — 85018 HEMOGLOBIN: CPT | Performed by: ADVANCED PRACTICE MIDWIFE

## 2018-06-11 RX ORDER — IBUPROFEN 800 MG/1
800 TABLET, FILM COATED ORAL EVERY 6 HOURS PRN
Qty: 90 TABLET | Refills: 0 | Status: SHIPPED | OUTPATIENT
Start: 2018-06-11 | End: 2018-08-08

## 2018-06-11 RX ORDER — LANOLIN 100 %
OINTMENT (GRAM) TOPICAL
Qty: 40 G | Refills: 0 | Status: SHIPPED | OUTPATIENT
Start: 2018-06-11 | End: 2018-07-31

## 2018-06-11 RX ORDER — AMOXICILLIN 250 MG
1 CAPSULE ORAL 2 TIMES DAILY
Qty: 100 TABLET | Refills: 0 | Status: SHIPPED | OUTPATIENT
Start: 2018-06-11 | End: 2018-07-31

## 2018-06-11 RX ADMIN — SENNOSIDES AND DOCUSATE SODIUM 2 TABLET: 8.6; 5 TABLET ORAL at 18:15

## 2018-06-11 RX ADMIN — SENNOSIDES AND DOCUSATE SODIUM 2 TABLET: 8.6; 5 TABLET ORAL at 09:50

## 2018-06-11 RX ADMIN — IBUPROFEN 800 MG: 800 TABLET, FILM COATED ORAL at 05:50

## 2018-06-11 RX ADMIN — IBUPROFEN 800 MG: 800 TABLET, FILM COATED ORAL at 18:14

## 2018-06-11 RX ADMIN — IBUPROFEN 800 MG: 800 TABLET, FILM COATED ORAL at 12:19

## 2018-06-11 NOTE — PLAN OF CARE
Problem: Patient Care Overview  Goal: Plan of Care/Patient Progress Review  Outcome: Improving  Data: Vital signs within normal limits. Postpartum checks within normal limits - see flow record. Patient eating and drinking normally. Had an episode of bladder incontinence. No apparent signs of infection. 3rd degree laceration healing well. Patient performing self cares and is able to care for infant.  Action: Patient medicated during the shift for pain. See MAR. Patient reassessed within 1 hour after each medication and pain was improved - patient stated she was comfortable. Patient education done about breast feeding positions. See flow record. Encouraged to fully empty bladder every 2 hours.   Response: Positive attachment behaviors observed with infant. Support persons  present.   Plan: Anticipate discharge on Tuesday June 12, 2018.

## 2018-06-11 NOTE — PROGRESS NOTES
"Post Partum Note    Moon Ordoñez      MRN#: [id number]  Age: 37 year old      YOB: 1980      Post-partum day #1    SIGNIFICANT PROBLEMS:  Patient Active Problem List    Diagnosis Date Noted     Labor and delivery, indication for care 2018     Priority: Medium     Elevated blood pressure reading without diagnosis of hypertension 2018     Priority: Medium     2018 One elevated /90, protein creat 0.26, other labs WNL.  Returning this week for second BP check, consider another urine protein/creat level       AMA (advanced maternal age) multigravida 35+ 2017     Priority: Medium     FOB: Reyes    MFM first trimester screening ordered  17 MFM genetics, innatal drawn and WNL, needs AFP at 15-20 weeks, and comprehensive at 18 weeks   2018 MFM fetal survey WNL, no follow up recommended      Pap smear due PP       Need for Tdap vaccination 10/25/2017     Priority: Medium     Screening for malignant neoplasm of cervix 2015     Priority: Medium     Overview:   Hx of abnormal paps: no   NILM, hpv negative  Plan: Co-test 2020    ICD 10         INTERVAL HISTORY:  /74  Temp 98.5  F (36.9  C) (Oral)  Resp 16  Ht 1.727 m (5' 8\")  Wt 78 kg (172 lb)  LMP 2017  SpO2 (!) 86%  Breastfeeding? Unknown  BMI 26.15 kg/m2    Pt stable, baby is rooming in  Breast feeding status:initiated  Complications since 2 hours post delivery: None  Patient is tolerating acitivity well Voiding without difficulty, cramping is relieved by Ibuprophen, lochia is decreasing and patient denies clots.  Perineal pain is is relieved by Ibuprophen.  The perineum laceration is well approximated    Normal postpartum exam, 3rd degree laceration repair    Postpartum hemoglobin   Hemoglobin   Date Value Ref Range Status   2018 10.0 (L) 11.7 - 15.7 g/dL Final     Blood type   Lab Results   Component Value Date    ABO O 2018       Lab Results   Component Value " Date    RH Pos 2018     Rubella status No results found for: RUBELLAABIGG    ASSESSMENT/PLAN:  Stable Post-partum day #1  Complications: 3rd degree laceration repair, [place] Breastfeeder  Plan d/c home tomorrow  RTC 8 weeks, plan IUD insertion  Teaching done: D/C Instructions: Nutrition/Activity, Engorgement Management, Birth Control Options, Warning Signs/When to Call: Excessive Bleeding, Infection, PP Depression, Kegals and Crunches, RTC Clinic for PP Appointment and PNV    Postpartum warning s/s reviewed, including bleeding/clots, fever, mastitis, or depression    Birthcontrol planned:IUD Mirena, plan to insert at 6-8 wks postpartum  Current Discharge Medication List      START taking these medications    Details   ibuprofen (ADVIL/MOTRIN) 800 MG tablet Take 1 tablet (800 mg) by mouth every 6 hours as needed for other (cramping)  Qty: 90 tablet, Refills: 0    Associated Diagnoses:  (normal spontaneous vaginal delivery)      lanolin ointment Apply topically every hour as needed for dry skin (sore nipples)  Qty: 40 g, Refills: 0    Associated Diagnoses:  (normal spontaneous vaginal delivery); Breast feeding status of mother      senna-docusate (SENOKOT-S;PERICOLACE) 8.6-50 MG per tablet Take 1 tablet by mouth 2 times daily  Qty: 100 tablet, Refills: 0    Associated Diagnoses:  (normal spontaneous vaginal delivery)         CONTINUE these medications which have NOT CHANGED    Details   Prenatal Vit-Fe Fumarate-FA (PRENATAL VITAMIN) 27-0.8 MG TABS

## 2018-06-11 NOTE — PLAN OF CARE
Problem: Patient Care Overview  Goal: Plan of Care/Patient Progress Review  Outcome: No Change  Patient admitted to [facility] from L&D after vaginal delivery of baby. Oriented to room, unit and call light. ID Bands checked with second RN.

## 2018-06-12 VITALS
BODY MASS INDEX: 26.07 KG/M2 | RESPIRATION RATE: 18 BRPM | SYSTOLIC BLOOD PRESSURE: 134 MMHG | HEIGHT: 68 IN | WEIGHT: 172 LBS | HEART RATE: 78 BPM | TEMPERATURE: 97.7 F | OXYGEN SATURATION: 96 % | DIASTOLIC BLOOD PRESSURE: 78 MMHG

## 2018-06-12 PROCEDURE — 25000132 ZZH RX MED GY IP 250 OP 250 PS 637: Performed by: ADVANCED PRACTICE MIDWIFE

## 2018-06-12 RX ADMIN — SENNOSIDES AND DOCUSATE SODIUM 2 TABLET: 8.6; 5 TABLET ORAL at 07:33

## 2018-06-12 RX ADMIN — IBUPROFEN 800 MG: 800 TABLET, FILM COATED ORAL at 12:23

## 2018-06-12 RX ADMIN — IBUPROFEN 800 MG: 800 TABLET, FILM COATED ORAL at 00:20

## 2018-06-12 RX ADMIN — IBUPROFEN 800 MG: 800 TABLET, FILM COATED ORAL at 06:47

## 2018-06-12 RX ADMIN — ACETAMINOPHEN 650 MG: 325 TABLET, FILM COATED ORAL at 07:35

## 2018-06-12 NOTE — PLAN OF CARE
Problem: Patient Care Overview  Goal: Plan of Care/Patient Progress Review  Outcome: Improving  VSS. Bonding well with baby. Breastfeeding with minimal assist from staff, though staff did check several latches overnight. Mom reports bilaterally sore nipples. Latch education reviewed and hydrogels used for comfort. Ambulating independently. Voiding without difficulty. Pain controlled with ibuprofen, tucks, ice and sitz baths. Dad at bedside for support. Continue with plan of care.

## 2018-06-12 NOTE — DISCHARGE INSTRUCTIONS

## 2018-06-12 NOTE — LACTATION NOTE
"This note was copied from a baby's chart.  Consult for first time breastfeeding and sore nipples.    Moon is a  who delivered her baby, Blnaca, at 39.4 weeks via vaginal delivery on 6/10/18 at 1521. She was induced due to gestational hypertension and her medical history includes AMA and gestational hypertension. She is otherwise healthy. Moon noted breast growth during early pregnancy and has been able to hand express colostrum. This morning she noted her colostrum seemed \"thinner or waterier\" and she was easily able to express drops before latching Blanca. Her breasts are symmetrical and rounded with bilateral everted nipples. She has a bruised area on her left nipple with a small crack that she reports is healing. Her right nipple is intact. She denies discomfort during feeding now that she is working to get a deep, asymmetric latch with each feeding. Baby Blanca has age appropriate output and her 24 hour weight loss was minimal at -3.8% of her birthweight. Blanca has been content between feedings. She has been working with her bedside RN and the resource RN. She and her , Eliezer, are excited to discharge to home today. [facility].    Reviewed benefits of skin to skin, benefits of breastfeeding on cue, early feeding cues, breastfeeding positions, asymmetric latch, signs feedings are going well, nutritive vs non-nutritive suck, satiety cues, benefits of hand expression to support milk supply/relieve discomfort when milk comes in and outpatient lactation resources.     Plan: Continue to assist with breastfeeding as needed while inpatient. Family will follow up with lactation RN's at  [facility] if needed after discharge. Family will also have homecare RN visit.  "

## 2018-06-12 NOTE — PLAN OF CARE
Problem: Patient Care Overview  Goal: Plan of Care/Patient Progress Review  Outcome: Adequate for Discharge Date Met: 06/12/18  The discharge instruction was reviewed with pt/spouse and ID band checked. Pt declined having any questions. Pt was given copies of the discharge papers. Pt discharge to home with baby.

## 2018-06-12 NOTE — PLAN OF CARE
Problem: Patient Care Overview  Goal: Plan of Care/Patient Progress Review  Outcome: Improving  Breastfeeding with assistance from RN and lactation to get deeper latch and maintain good position throughout feeding.  also taught how to assist deeper latch. Using hydrogels for nipple tenderness and scab left nipple. States perineal discomfort is managed with ibuprofen, tucks, cold packs, and tub soaks. Aware that tylenol is also available. Bonding well with baby, as is , and both are participating in baby's care. Has breast pump at home already.

## 2018-06-12 NOTE — PROGRESS NOTES
"Post Partum Note    Moon Ordoñez      MRN#: [id number]  Age: 37 year old      YOB: 1980      Post-partum day #2    SIGNIFICANT PROBLEMS:  Patient Active Problem List    Diagnosis Date Noted     Labor and delivery, indication for care 2018     Priority: Medium     Elevated blood pressure reading without diagnosis of hypertension 2018     Priority: Medium     2018 One elevated /90, protein creat 0.26, other labs WNL.  Returning this week for second BP check, consider another urine protein/creat level       AMA (advanced maternal age) multigravida 35+ 2017     Priority: Medium     FOB: Reyes    MFM first trimester screening ordered  17 MFM genetics, innatal drawn and WNL, needs AFP at 15-20 weeks, and comprehensive at 18 weeks   2018 MFM fetal survey WNL, no follow up recommended      Pap smear due PP       Need for Tdap vaccination 10/25/2017     Priority: Medium     Screening for malignant neoplasm of cervix 2015     Priority: Medium     Overview:   Hx of abnormal paps: no   NILM, hpv negative  Plan: Co-test 2020    ICD 10         INTERVAL HISTORY:  /78  Pulse 78  Temp 97.7  F (36.5  C) (Oral)  Resp 18  Ht 1.727 m (5' 8\")  Wt 78 kg (172 lb)  LMP 2017  SpO2 96%  Breastfeeding? Unknown  BMI 26.15 kg/m2    Pt stable, baby is rooming in  Breast feeding status:initiated  Complications since 2 hours post delivery: None  Patient is tolerating acitivity well Voiding without difficulty, cramping is minimal and is relieved by Ibuprophen, lochia is decreasing and patient denies clots.  Perineal pain is is relieved by Ibuprophen.  The perineum laceration is well approximated    Normal postpartum exam 3rd degree laceration    Postpartum hemoglobin   Hemoglobin   Date Value Ref Range Status   2018 10.0 (L) 11.7 - 15.7 g/dL Final     Blood type   Lab Results   Component Value Date    ABO O 2018       Lab Results "   Component Value Date    RH Pos 2018     Rubella status No results found for: RUBELLAABIGG    ASSESSMENT/PLAN:  Stable Post-partum day #2  Complications:none  Plan d/c home today  RTC 8 weeks, plan IUD insertion  Teaching done: D/C Instructions: Nutrition/Activity, Engorgement Management, Birth Control Options, Warning Signs/When to Call: Excessive Bleeding, Infection, PP Depression, Kegals and Crunches, RTC Clinic for PP Appointment and PNV    Postpartum warning s/s reviewed, including bleeding/clots, fever, mastitis, or depression    Birthcontrol planned:IUD Mirena, plan to insert at 6-8 wks postpartum  Current Discharge Medication List      START taking these medications    Details   ibuprofen (ADVIL/MOTRIN) 800 MG tablet Take 1 tablet (800 mg) by mouth every 6 hours as needed for other (cramping)  Qty: 90 tablet, Refills: 0    Associated Diagnoses:  (normal spontaneous vaginal delivery)      lanolin ointment Apply topically every hour as needed for dry skin (sore nipples)  Qty: 40 g, Refills: 0    Associated Diagnoses:  (normal spontaneous vaginal delivery); Breast feeding status of mother      senna-docusate (SENOKOT-S;PERICOLACE) 8.6-50 MG per tablet Take 1 tablet by mouth 2 times daily  Qty: 100 tablet, Refills: 0    Associated Diagnoses:  (normal spontaneous vaginal delivery)         CONTINUE these medications which have NOT CHANGED    Details   Prenatal Vit-Fe Fumarate-FA (PRENATAL VITAMIN) 27-0.8 MG TABS

## 2018-06-12 NOTE — PLAN OF CARE
Problem: Patient Care Overview  Goal: Plan of Care/Patient Progress Review  Outcome: Improving  Data: Vital signs within normal limits. Postpartum checks within normal limits - see flow record. Patient eating and drinking normally. Patient able to empty bladder independently and is up ambulating. No apparent signs of infection. Incision healing well. Patient performing self cares and is able to care for infant.  Action: Patient medicated during the shift for pain and cramping. See MAR. Patient reassessed within 1 hour after each medication and pain was improved - patient stated she was comfortable. Patient education done about discharge to home. See flow record.  Response: Positive attachment behaviors observed with infant. Support persons spouse present.   Plan: Anticipate discharge home today.

## 2018-06-13 ENCOUNTER — TELEPHONE (OUTPATIENT)
Dept: MIDWIFE SERVICES | Facility: CLINIC | Age: 38
End: 2018-06-13

## 2018-06-13 NOTE — TELEPHONE ENCOUNTER
Pt recently delivered and was just discharged yesterday. She had tear during delivery and is now experiencing some incontinence issues that she is worried about.

## 2018-06-13 NOTE — TELEPHONE ENCOUNTER
Recently delivered vaginally with 3rd degree tear - is currently take BID stool softeners. Noticed yesterday that she was unable to control liquidy stool. Discussed that she is most likely taking too much stool softener - to not take a dose today and see how things goes. May need to take one a day or every other day until stool is more solid like playdo and not frequent. Will increase fluids today and see how things go - to call back if other questions or concerns.  Jazmin Ag

## 2018-06-14 ENCOUNTER — TELEPHONE (OUTPATIENT)
Dept: PEDIATRICS | Facility: CLINIC | Age: 38
End: 2018-06-14

## 2018-06-14 DIAGNOSIS — O92.70 LACTATION DISORDER: Primary | ICD-10-CM

## 2018-06-14 RX ORDER — MUPIROCIN 20 MG/G
OINTMENT TOPICAL
Qty: 22 G | Refills: 1 | Status: SHIPPED | OUTPATIENT
Start: 2018-06-14 | End: 2018-07-31

## 2018-07-31 ENCOUNTER — NURSE TRIAGE (OUTPATIENT)
Dept: NURSING | Facility: CLINIC | Age: 38
End: 2018-07-31

## 2018-07-31 ENCOUNTER — OFFICE VISIT (OUTPATIENT)
Dept: MIDWIFE SERVICES | Facility: CLINIC | Age: 38
End: 2018-07-31
Payer: COMMERCIAL

## 2018-07-31 VITALS
HEART RATE: 95 BPM | HEIGHT: 68 IN | BODY MASS INDEX: 21.87 KG/M2 | SYSTOLIC BLOOD PRESSURE: 115 MMHG | DIASTOLIC BLOOD PRESSURE: 70 MMHG | WEIGHT: 144.3 LBS | TEMPERATURE: 98 F

## 2018-07-31 DIAGNOSIS — N61.0 ACUTE MASTITIS OF RIGHT BREAST: Primary | ICD-10-CM

## 2018-07-31 PROCEDURE — 99213 OFFICE O/P EST LOW 20 MIN: CPT | Performed by: ADVANCED PRACTICE MIDWIFE

## 2018-07-31 RX ORDER — DICLOXACILLIN SODIUM 500 MG
500 CAPSULE ORAL 4 TIMES DAILY
Qty: 40 CAPSULE | Refills: 0 | Status: SHIPPED | OUTPATIENT
Start: 2018-07-31 | End: 2018-08-10

## 2018-07-31 NOTE — MR AVS SNAPSHOT
After Visit Summary   7/31/2018    Moon Ordoñez    MRN: [id number]           Patient Information     Date Of Birth          1980        Visit Information        Provider Department      7/31/2018 12:45 PM Ce Puckett CNM [facility]        Today's Diagnoses     Acute mastitis of right breast    -  1       Follow-ups after your visit        Follow-up notes from your care team     Return if symptoms worsen or fail to improve.      Your next 10 appointments already scheduled     Aug 08, 2018 12:15 PM CDT   Post Partum with DORINDA Nevarez CNM   [facility] ([facility])    [address] [place] [id number] [phone]              Who to contact     If you have questions or need follow up information about today's clinic visit or your schedule please contact [facility] directly at [phone].  Normal or non-critical lab and imaging results will be communicated to you by MyChart, letter or phone within 4 business days after the clinic has received the results. If you do not hear from us within 7 days, please contact the clinic through [employer] or phone. If you have a critical or abnormal lab result, we will notify you by phone as soon as possible.  Submit refill requests through "[employer] or call your pharmacy and they will forward the refill request to us. Please allow 3 business days for your refill to be completed.          Additional Information About Your Visit        MyChart Information     "[employer] gives you secure access to your electronic health record. If you see a primary care provider, you can also send messages to your care team and make appointments. If you have questions, please call your primary care clinic.  If you do not have a primary care provider, please call [phone] and they will assist you.        Care EveryWhere ID     This is your Care EveryWhere ID.  "This could be used by other organizations to access your [place] medical records  [id number]        Your Vitals Were     Pulse Temperature Height Last Period Breastfeeding? BMI (Body Mass Index)    95 98  F (36.7  C) (Oral) 5' 8\" (1.727 m) 09/06/2017 Yes 21.94 kg/m2       Blood Pressure from Last 3 Encounters:   07/31/18 115/70   06/12/18 134/78   06/08/18 (!) 128/92    Weight from Last 3 Encounters:   07/31/18 144 lb 4.8 oz (65.5 kg)   06/09/18 172 lb (78 kg)   06/08/18 172 lb 1.6 oz (78.1 kg)              Today, you had the following     No orders found for display         Today's Medication Changes          These changes are accurate as of 7/31/18  3:00 PM.  If you have any questions, ask your nurse or doctor.               Start taking these medicines.        Dose/Directions    dicloxacillin 500 MG capsule   Commonly known as:  DYNAPEN   Used for:  Acute mastitis of right breast   Started by:  Ce Puckett CNM        Dose:  500 mg   Take 1 capsule (500 mg) by mouth 4 times daily for 10 days   Quantity:  40 capsule   Refills:  0         Stop taking these medicines if you haven't already. Please contact your care team if you have questions.     lanolin ointment   Stopped by:  Ce Puckett CNM           mupirocin 2 % ointment   Commonly known as:  BACTROBAN   Stopped by:  Ce Puckett CNM           senna-docusate 8.6-50 MG per tablet   Commonly known as:  SENOKOT-S;PERICOLACE   Stopped by:  Ce Puckett CNM                Where to get your medicines      These medications were sent to [id number] - [place], [place] - [address] AT [facility] [address]  [facility] [address], [place] [address]    Hours:  24-hours Phone:  [phone]     dicloxacillin 500 MG capsule                Primary Care Provider Office Phone # Fax #    Marychuy PALLAVI Murphy [phone]       [facility] [address]      "   Equal Access to Services     [facility] : Hadii aad ku hadadrianasatish Mendieta, wakielda lubookerdenverha, qacaitlynjhoan bocanegravaleriajose condon. So [place] [facility] [phone].    ATENCIÓN: Si habla español, tiene a benitez disposición servicios gratuitos de asistencia lingüística. Llame al [phone].    We comply with applicable federal civil rights laws and [place] laws. We do not discriminate on the basis of race, color, national origin, age, disability, sex, sexual orientation, or gender identity.            Thank you!     Thank you for choosing [facility]  for your care. Our goal is always to provide you with excellent care. Hearing back from our patients is one way we can continue to improve our services. Please take a few minutes to complete the written survey that you may receive in the mail after your visit with us. Thank you!             Your Updated Medication List - Protect others around you: Learn how to safely use, store and throw away your medicines at www.disposemymeds.org.          This list is accurate as of 18  3:00 PM.  Always use your most recent med list.                   Brand Name Dispense Instructions for use Diagnosis    dicloxacillin 500 MG capsule    DYNAPEN    40 capsule    Take 1 capsule (500 mg) by mouth 4 times daily for 10 days    Acute mastitis of right breast       ibuprofen 800 MG tablet    ADVIL/MOTRIN    90 tablet    Take 1 tablet (800 mg) by mouth every 6 hours as needed for other (cramping)     (normal spontaneous vaginal delivery)       Prenatal Vitamin 27-0.8 MG Tabs

## 2018-07-31 NOTE — PROGRESS NOTES
S:  Here for concerns about Mastitis.  She has been feeling chills, malaise and elevated temp since yesterday.  Right breast is reddened and slightly tender.  She recently started pumping to save milk for when she goes back to work.  She continues to breastfeed her baby from both sides.    O:  Right breast is reddened   BP normal today   A:  Mastitis   P:  Antibiotics prescribed and explained  Recommended that she continue to empty both breasts  Discussed probiotics to promote good bacteria  Return to clinic  As needed.      17 minutes was spent face to face with the patient today discussing her history, diagnosis, and follow-up plan as noted above. Over 50% of the visit was spent in counseling and coordination of care.    Total Visit Time: 17 minutes.

## 2018-07-31 NOTE — NURSING NOTE
"Chief Complaint   Patient presents with     Breast Problem     possible mastitis. called FNA.       Initial /70  Pulse 95  Temp 98  F (36.7  C) (Oral)  Ht 5' 8\" (1.727 m)  Wt 144 lb 4.8 oz (65.5 kg)  LMP 2017  Breastfeeding? Yes  BMI 21.94 kg/m2 Estimated body mass index is 21.94 kg/(m^2) as calculated from the following:    Height as of this encounter: 5' 8\" (1.727 m).    Weight as of this encounter: 144 lb 4.8 oz (65.5 kg).  BP completed using cuff size: regular        The following HM Due: NONE      The following patient reported/Care Every where data was sent to:  P ABSTRACT QUALITY INITIATIVES [[id number]]        patient has appointment for today    Ameena Burgos CMA                "

## 2018-07-31 NOTE — TELEPHONE ENCOUNTER
Pt is breastfeeding and suspects mastitis, had low grade temperature last night around 100 F and again when she checked this morning it was 99.9 F. Her breast is red and slightly painful at about a 2 outt of 10 on pain scale.    Protocol and care advice reviewed.  Per guidelines recommendation is to be seen in 4 hrs, transferred her to schedulers to make an appt.  Caller states understanding of the recommended disposition.   Advised to call back if further questions or concerns.      Reason for Disposition    [1] Red area AND [2] fever    Additional Information    Negative: Chest pain    Negative: Patient is breastfeeding    Negative: Postpartum breast pain and swelling, not breastfeeding    Negative: Small spot, skin growth or mole    Negative: [1] SEVERE breast pain AND [2] fever > 103 F (39.4 C)    Negative: Patient sounds very sick or weak to the triager    Protocols used: BREAST SYMPTOMS-ADULT-

## 2018-08-08 ENCOUNTER — PRENATAL OFFICE VISIT (OUTPATIENT)
Dept: MIDWIFE SERVICES | Facility: CLINIC | Age: 38
End: 2018-08-08
Payer: COMMERCIAL

## 2018-08-08 VITALS
WEIGHT: 142.7 LBS | TEMPERATURE: 97.7 F | BODY MASS INDEX: 21.63 KG/M2 | HEART RATE: 71 BPM | DIASTOLIC BLOOD PRESSURE: 69 MMHG | HEIGHT: 68 IN | OXYGEN SATURATION: 97 % | SYSTOLIC BLOOD PRESSURE: 112 MMHG

## 2018-08-08 DIAGNOSIS — Z12.4 SCREENING FOR CERVICAL CANCER: ICD-10-CM

## 2018-08-08 DIAGNOSIS — Z30.430 ENCOUNTER FOR IUD INSERTION: ICD-10-CM

## 2018-08-08 LAB — BETA HCG QUAL IFA URINE: NEGATIVE

## 2018-08-08 PROCEDURE — 87624 HPV HI-RISK TYP POOLED RSLT: CPT | Performed by: ADVANCED PRACTICE MIDWIFE

## 2018-08-08 PROCEDURE — 99207 ZZC POST PARTUM EXAM: CPT | Performed by: ADVANCED PRACTICE MIDWIFE

## 2018-08-08 PROCEDURE — G0145 SCR C/V CYTO,THINLAYER,RESCR: HCPCS | Performed by: ADVANCED PRACTICE MIDWIFE

## 2018-08-08 PROCEDURE — 58300 INSERT INTRAUTERINE DEVICE: CPT | Performed by: ADVANCED PRACTICE MIDWIFE

## 2018-08-08 PROCEDURE — 84703 CHORIONIC GONADOTROPIN ASSAY: CPT | Performed by: ADVANCED PRACTICE MIDWIFE

## 2018-08-08 ASSESSMENT — PATIENT HEALTH QUESTIONNAIRE - PHQ9: 5. POOR APPETITE OR OVEREATING: NOT AT ALL

## 2018-08-08 ASSESSMENT — ANXIETY QUESTIONNAIRES
7. FEELING AFRAID AS IF SOMETHING AWFUL MIGHT HAPPEN: NOT AT ALL
1. FEELING NERVOUS, ANXIOUS, OR ON EDGE: NOT AT ALL
6. BECOMING EASILY ANNOYED OR IRRITABLE: NOT AT ALL
2. NOT BEING ABLE TO STOP OR CONTROL WORRYING: NOT AT ALL
GAD7 TOTAL SCORE: 0
3. WORRYING TOO MUCH ABOUT DIFFERENT THINGS: NOT AT ALL
5. BEING SO RESTLESS THAT IT IS HARD TO SIT STILL: NOT AT ALL
IF YOU CHECKED OFF ANY PROBLEMS ON THIS QUESTIONNAIRE, HOW DIFFICULT HAVE THESE PROBLEMS MADE IT FOR YOU TO DO YOUR WORK, TAKE CARE OF THINGS AT HOME, OR GET ALONG WITH OTHER PEOPLE: NOT DIFFICULT AT ALL

## 2018-08-08 NOTE — LETTER
August 15, 2018    Moon Ordoñez  [address] [place] [address]    Dear Moon,  We are happy to inform you that your PAP smear result from 08/08/18 is normal.  We are now able to do a follow up test on PAP smears. The DNA test is for HPV (Human Papilloma Virus). Cervical cancer is closely linked with certain types of HPV. Your results showed no evidence of high risk HPV.  Therefore we recommend you return in 5 years for your next pap smear and HPV test.  You will still need to return to the clinic every year for an annual exam and other preventive tests.  Please contact the clinic at [phone] with any questions.  Sincerely,    DORINDA Nevarez CNM/rickey

## 2018-08-08 NOTE — PATIENT INSTRUCTIONS

## 2018-08-08 NOTE — PROGRESS NOTES
"SUBJECTIVE:   Moon Ordoñez is here for her 6-week postpartum checkup.     HPI: Patient is doing well. Here with  and daughter. They are all adjusting well and happy with how everything is going. She feels like she is healing well from her 3rd degree. Has no control over her gas. Discussed that is normal at this time and she will eventually heal. She heard about pelvic floor physical therapy and we discussed starting that now versus later. She would like to start now, referral sent. She would like the IUD for birth control, would like Mirena. She is breastfeeding which is going well. She plans to return to work next month. She has no bleeding or discomfort. She has been getting outside and hiking and walking around. Discussed exercise. She is due for a pap smear, collected today.     DELIVERY DATE: 6/10/2018   of a viable girl, weight 8 pounds 4 oz., with no complications.  FEEDING METHOD:    CONTRACEPTION PLANNED: Mirena IUD  She  has not had intercourse since delivery and complains of No discomfort.  HX OF DEPRESSION:No  HX OF ABUSE:No  OTHER HPI: She has no signs of infection, bleeding or other complications. Bleeding stopped, epis/lac healing well.         EXAM:  /69 (BP Location: Left arm, Patient Position: Sitting, Cuff Size: Adult Regular)  Pulse 71  Temp 97.7  F (36.5  C) (Oral)  Ht 5' 8\" (1.727 m)  Wt 142 lb 11.2 oz (64.7 kg)  SpO2 97%  Breastfeeding? Yes  BMI 21.7 kg/m2  GENERAL APPEARANCE: healthy, alert and no distress  NECK: thyroid normal to palpation  BREAST: soft, nontender, nipples intact, lactating   ABDOMEN: soft, nontender, diastasis recti closing  PSYCH: mentation appears normal and affect normal/bright  PELVIC EXAM:  Vulva: BUS WNL, no lesions noted  Vagina: Discharge normal and physiologic, no lesions, well rugated, good tone  Cervix: Smooth, pink, no visible lesions, neg CMT  Uterus: Normal size and position, non-tender, mobile  Ovaries: No masses " palpable, non-tender, mobile  Rectal exam: deferred    ASSESSMENT:   Normal postpartum exam after .    PLAN:  Birth Control as ordered. Fertility reviewed.    Return as needed or at time interval for next routine pap, pelvic, or breast exam.  Encourage Kegals and abdominal exercise. Slow, steady weight loss.  Continue a multi vitamin supplement, especially if breastfeeding.  Pap smear was obtained.  GC/CHLAMYDIA CULTURE OBTAINED:NO   Post partum Hgb was not obtained.    IUD PLACEMENT   Chief Complaint/ History of Present Illness:Moon Ordoñez is a 37 year old  female who desires Mirena IUD placement. We discussed risks, benefits, and what to expect afterwards.   No LMP recorded. Patient is not currently having periods (Reason: Breast Feeding)..  Today's pregnancy test negative.  She is here for an IUD insertion.  Patient has been given written information.  I have reviewed the risks of the IUD including pregnancy, PID, life threatening infection, perforation, expulsion, cramping, changes in bleeding and ovarian cysts. Benefits of the IUD and alternative family planning methods have been discussed.  Patients questions are answered.  Patient has verbalized understanding of risks and benefits and has signed the consent form.    No Known Allergies  Current Outpatient Prescriptions   Medication Sig Dispense Refill     dicloxacillin (DYNAPEN) 500 MG capsule Take 1 capsule (500 mg) by mouth 4 times daily for 10 days 40 capsule 0     Prenatal Vit-Fe Fumarate-FA (PRENATAL VITAMIN) 27-0.8 MG TABS         Past Medical History:   Diagnosis Date     NO ACTIVE PROBLEMS      Past Surgical History:   Procedure Laterality Date     NO HISTORY OF SURGERY         REVIEW OF SYSTEMS  CONSTITUTIONAL: Denies fever, chills and night sweats  BREASTS:  Denies discharge and lumps.    HEART/LUNGS: Denies dyspnea, wheezing, coughing and chest pain.  HEMATOLOGIC: Denies tendency to bruise and history of blood  "clots.  GENITOURINARY:  Denies urgency, dysuria and hematuria.  NEUROLOGIC:  Denies seizures, weakness and fainting.  PSYCHIATRIC: Negative for changes in mood or affect      EXAM:  /69 (BP Location: Left arm, Patient Position: Sitting, Cuff Size: Adult Regular)  Pulse 71  Temp 97.7  F (36.5  C) (Oral)  Ht 5' 8\" (1.727 m)  Wt 142 lb 11.2 oz (64.7 kg)  SpO2 97%  Breastfeeding? Yes  BMI 21.7 kg/m2    Abdomen: soft, nontender, without hepatosplenomegaly or masses  : normal cervix, adnexae, and uterus without masses or discharge  IUD type: Mirena  Lot # [id number]    Procedure:  Uterus assessed for position and is Anteverted.  Speculum inserted.  Betadine prep of cervix done.  Tenaculum applied at 11 and 1 o'clock position and gentle traction was applied to elongate the cervical canal.  Uterus sounded to 7 cm's.  Cervical dilators were not used.   IUD inserted in the usual fashion without problems, ie: resistance, severe protracted pain or excessive bleeding.  Tenaculum was removed with scant bleeding from the tenaculum site that was managed with some direct pressure using Garcia swabs.  Strings trimmed to 3 cm's.  Patient tolerated the procedure well without any prolonged pain or syncopy.      ASSESSMENT/ PLAN:  (Z39.2) Routine postpartum follow-up  (primary encounter diagnosis)    (Z12.4) Screening for cervical cancer  Plan: HPV High Risk Types DNA Cervical, Pap imaged         thin layer screen with HPV - recommended age 30        - 65 years (select HPV order below)    (Z30.430) Encounter for IUD insertion  Plan: Beta HCG qual IFA urine    GC/Chlamydia Screening:  NO       Instructions given to patient regarding checking IUD strings, returning to the clinic if pain or inability to check strings and/or irregular bleeding.    Pt to RTC in 4-6 weeks for IUD check.    Francesca Holt CNM   "

## 2018-08-08 NOTE — PROGRESS NOTES
"Chief Complaint   Patient presents with     Post Partum Exam     IUD       Initial /69 (BP Location: Left arm, Patient Position: Sitting, Cuff Size: Adult Regular)  Pulse 71  Temp 97.7  F (36.5  C) (Oral)  Ht 5' 8\" (1.727 m)  Wt 142 lb 11.2 oz (64.7 kg)  SpO2 97%  Breastfeeding? Yes  BMI 21.7 kg/m2 Estimated body mass index is 21.7 kg/(m^2) as calculated from the following:    Height as of this encounter: 5' 8\" (1.727 m).    Weight as of this encounter: 142 lb 11.2 oz (64.7 kg).  BP completed using cuff size: regular        The following HM Due: pap smear      The following patient reported/Care Every where data was sent to:  P ABSTRACT QUALITY INITIATIVES [[id number]]  n/a      patient has appointment for today and orders have been placed                                                                                     MIRENA:   LOT: [id number]    EXP: 2021   NDC: 16539-944-25  "

## 2018-08-08 NOTE — MR AVS SNAPSHOT
After Visit Summary   8/8/2018    Moon Ordoñez    MRN: [id number]           Patient Information     Date Of Birth          1980        Visit Information        Provider Department      8/8/2018 12:15 PM Francesca Holt APRN CNM [facility] [place] [place]        Today's Diagnoses     Routine postpartum follow-up    -  1    Screening for cervical cancer        Encounter for IUD insertion          Care Instructions    What Mirena Users May Expect    What to watch for right after Mirena is placed  Some women may experience uterine cramps, bleeding, and/or dizziness during and right after Mirena is placed. To help minimize the cramps, you may taken ibuprofen 600 mg with food prior to your appointment. These symptoms should improve over the next 24 hours.  Mild cramping may be present for a few days after your placement  As a follow up, you should check your strings on 4 weeks or visit your clinic once in the first 4 to 12 weeks after Mirena is placed to make sure it is in the right position. After that, Mirena can be checked once a year as part of your routine exam.    Please use a back-up method (abstinence or condoms) for 5 days after placement.    Your periods may change  For the first 3 to 6 months, your monthly period may become irregular. You may also have frequent spotting or light bleeding. A few women have heavy bleeding during this time. After your body adjusts, the number of bleeding days is likely to decrease (but may remain irregular), and you may even find that your periods stop altogether for as long as Mirena is in place. Around the end of the third month of use, you may see up to a 75% reduction in the amount of menstrual bleeding. By one year, about 1 out of 5 users may hay have no period at all. At the end of two years, 70% have little or no bleeding. Your periods will return rapidly once Mirena is removed.     Mirena Strings  You may check your own Mirena strings  by inserting a finger into the vagina and feeling the strings as they exit the cervix.  The strings will initially feel firm, like fishing line, but will soften over a few weeks.  After the strings have softened, you or your partner should not be able to feel the strings during intercourse.  If you can feel the IUD, see your healthcare provider to have the position confirmed.  You may use tampons with Mirena in place.    Mirena does not protect against HIV or STDs.  Mirena does not prevent the formation of ovarian cysts.  Mirena does not typically reduce acne or cause weight gain or mood changes.    Please call [facility] at [phone] if you have questions or concerns.    For more information:  [email or web address]            Follow-ups after your visit        Who to contact     If you have questions or need follow up information about today's clinic visit or your schedule please contact [facility] directly at [phone].  Normal or non-critical lab and imaging results will be communicated to you by [email or web address]Droppy[employer]hart, letter or phone within 4 business days after the clinic has received the results. If you do not hear from us within 7 days, please contact the clinic through MyQuoteAppt or phone. If you have a critical or abnormal lab result, we will notify you by phone as soon as possible.  Submit refill requests through [employer] or call your pharmacy and they will forward the refill request to us. Please allow 3 business days for your refill to be completed.          Additional Information About Your Visit        Dian[employer]ba Information     [employer] gives you secure access to your electronic health record. If you see a primary care provider, you can also send messages to your care team and make appointments. If you have questions, please call your primary care clinic.  If you do not have a primary care provider, please call [phone] and they will assist you.        Care EveryWhere ID   "   This is your Care EveryWhere ID. This could be used by other organizations to access your Center medical records  [id number]        Your Vitals Were     Pulse Temperature Height Pulse Oximetry Breastfeeding? BMI (Body Mass Index)    71 97.7  F (36.5  C) (Oral) 5' 8\" (1.727 m) 97% Yes 21.7 kg/m2       Blood Pressure from Last 3 Encounters:   08/08/18 112/69   07/31/18 115/70   06/12/18 134/78    Weight from Last 3 Encounters:   08/08/18 142 lb 11.2 oz (64.7 kg)   07/31/18 144 lb 4.8 oz (65.5 kg)   06/09/18 172 lb (78 kg)              We Performed the Following     Beta HCG qual IFA urine     HPV High Risk Types DNA Cervical     Pap imaged thin layer screen with HPV - recommended age 30 - 65 years (select HPV order below)          Today's Medication Changes          These changes are accurate as of 8/8/18 12:58 PM.  If you have any questions, ask your nurse or doctor.               Stop taking these medicines if you haven't already. Please contact your care team if you have questions.     ibuprofen 800 MG tablet   Commonly known as:  ADVIL/MOTRIN   Stopped by:  Francesca Holt APRN CNM                    Primary Care Provider Office Phone # Fax #    Marychuy OLIVO Jeffrey [phone]       [facility] [address] [address]        Equal Access to Services     ERIN QUICK : Hadii noe Mendieta, waaxda luqadaha, qaybta kaalmada ladonna, jose maddox. So [facility] [phone].    ATENCIÓN: Si habla español, tiene a benitez disposición servicios gratuitos de asistencia lingüística. Nae rosa [phone].    We comply with applicable federal civil rights laws and [place] laws. We do not discriminate on the basis of race, color, national origin, age, disability, sex, sexual orientation, or gender identity.            Thank you!     Thank you for choosing [facility] [place] [place]  for your care. Our goal is always to provide you with " excellent care. Hearing back from our patients is one way we can continue to improve our services. Please take a few minutes to complete the written survey that you may receive in the mail after your visit with us. Thank you!             Your Updated Medication List - Protect others around you: Learn how to safely use, store and throw away your medicines at www.disposemymeds.org.          This list is accurate as of 8/8/18 12:58 PM.  Always use your most recent med list.                   Brand Name Dispense Instructions for use Diagnosis    dicloxacillin 500 MG capsule    DYNAPEN    40 capsule    Take 1 capsule (500 mg) by mouth 4 times daily for 10 days    Acute mastitis of right breast       Prenatal Vitamin 27-0.8 MG Tabs

## 2018-08-10 LAB
COPATH REPORT: NORMAL
PAP: NORMAL

## 2018-08-10 ASSESSMENT — ANXIETY QUESTIONNAIRES: GAD7 TOTAL SCORE: 0

## 2018-08-10 ASSESSMENT — PATIENT HEALTH QUESTIONNAIRE - PHQ9: SUM OF ALL RESPONSES TO PHQ QUESTIONS 1-9: 2

## 2018-08-14 LAB
FINAL DIAGNOSIS: NORMAL
HPV HR 12 DNA CVX QL NAA+PROBE: NEGATIVE
HPV16 DNA SPEC QL NAA+PROBE: NEGATIVE
HPV18 DNA SPEC QL NAA+PROBE: NEGATIVE
SPECIMEN DESCRIPTION: NORMAL
SPECIMEN SOURCE CVX/VAG CYTO: NORMAL

## 2018-09-10 ENCOUNTER — THERAPY VISIT (OUTPATIENT)
Dept: PHYSICAL THERAPY | Facility: CLINIC | Age: 38
End: 2018-09-10
Payer: COMMERCIAL

## 2018-09-10 DIAGNOSIS — M99.05 SOMATIC DYSFUNCTION OF PELVIC REGION: Primary | ICD-10-CM

## 2018-09-10 DIAGNOSIS — N39.46 MIXED INCONTINENCE: ICD-10-CM

## 2018-09-10 PROCEDURE — 97161 PT EVAL LOW COMPLEX 20 MIN: CPT | Mod: GP | Performed by: PHYSICAL THERAPIST

## 2018-09-10 PROCEDURE — 97112 NEUROMUSCULAR REEDUCATION: CPT | Mod: GP | Performed by: PHYSICAL THERAPIST

## 2018-09-10 PROCEDURE — 97530 THERAPEUTIC ACTIVITIES: CPT | Mod: GP | Performed by: PHYSICAL THERAPIST

## 2018-09-10 NOTE — MR AVS SNAPSHOT
After Visit Summary   9/10/2018    Moon Ordoñez    MRN: [id number]           Patient Information     Date Of Birth          1980        Visit Information        Provider Department      9/10/2018 4:00 PM Gabrielle Maldonado PT [facility]        Today's Diagnoses     Somatic dysfunction of pelvic region    -  1    Mixed incontinence           Follow-ups after your visit        Your next 10 appointments already scheduled     Sep 21, 2018  4:40 PM CDT   DOROTHY For Women Only with Gabrielle Maldonado PT   [facility] ([facility])    [address] [place] [address]   [phone]            Sep 26, 2018  4:00 PM CDT   DOROTHY For Women Only with Gabrielle Maldonado PT   [facility] ([facility]  )    [address] [place] [address] [phone]            Oct 01, 2018  4:00 PM CDT   DOROTHY For Women Only with Gabrielle Maldonado PT   [facility] ([facility]  )    [address] [place] [address] [phone]              Who to contact     If you have questions or need follow up information about today's clinic visit or your schedule please contact [facility] directly at [phone].  Normal or non-critical lab and imaging results will be communicated to you by MyChart, letter or phone within 4 business days after the clinic has received the results. If you do not hear from us within 7 days, please contact the clinic through MyChart or phone. If you have a critical or abnormal lab result, we will notify you by phone as soon as possible.  Submit refill requests through [employer] or call your pharmacy and they will forward the refill request to us. Please allow 3 business days for your refill to be completed.           Additional Information About Your Visit        MyChart Information     [facility] gives you secure access to your electronic health record. If you see a primary care provider, you can also send messages to your care team and make appointments. If you have questions, please call your primary care clinic.  If you do not have a primary care provider, please call [phone] and they will assist you.        Care EveryWhere ID     This is your Care EveryWhere ID. This could be used by other organizations to access your [facility] medical records  [id number]         Blood Pressure from Last 3 Encounters:   08/08/18 112/69   07/31/18 115/70   06/12/18 134/78    Weight from Last 3 Encounters:   08/08/18 64.7 kg (142 lb 11.2 oz)   07/31/18 65.5 kg (144 lb 4.8 oz)   06/09/18 78 kg (172 lb)              We Performed the Following     HC PT EVAL, LOW COMPLEXITY     DOROTHY INITIAL EVAL REPORT     NEUROMUSCULAR RE-EDUCATION     THERAPEUTIC ACTIVITIES        Primary Care Provider Office Phone # Fax #    Marychuy OLIVO Murphy [phone]       [facility] [address] [facility] [address]        Equal Access to Services     GILL QUICK : Hadii aad ku dannio Sodanie, waaxda luqadaha, qaybta kaalmada adebriiyada, jose shen . So [facility] [phone].    ATENCIÓN: Si habla español, tiene a benitez disposición servicios gratuitos de asistencia lingüística. [facility] [phone].    We comply with applicable federal civil rights laws and [place] laws. We do not discriminate on the basis of race, color, national origin, age, disability, sex, sexual orientation, or gender identity.            Thank you!     Thank you for choosing [facility]  for your care. Our goal is always to provide you with excellent care. Hearing back from our patients is one way we can continue to improve our services. Please take a few minutes to complete  the written survey that you may receive in the mail after your visit with us. Thank you!             Your Updated Medication List - Protect others around you: Learn how to safely use, store and throw away your medicines at www.disposemymeds.org.          This list is accurate as of 9/10/18 11:59 PM.  Always use your most recent med list.                   Brand Name Dispense Instructions for use Diagnosis    levonorgestrel 20 MCG/24HR IUD    MIRENA (52 MG)    1 each    1 each (20 mcg) by Intrauterine route once for 1 dose    Encounter for IUD insertion       Prenatal Vitamin 27-0.8 MG Tabs

## 2018-09-10 NOTE — PROGRESS NOTES
[facility]  System  Physical Exam  General   ROS        Physical Therapy Initial Examination/Evaluation September 10, 2018   Moon Ordoñez is a 37 year old female referred to physical therapy by Dr. Francesca Holt for treatment of incontinence following 3rd degree tear with Precautions/Restrictions/MD instructions none noted   Therapist Assessment:   Clinical Impression: Pt presents to [place] [facility] [facility] with primary complaint of urgency and incontinence, especially inability to control gas.  Per clinical examination, pt with hypotonus muscles in pelvic floor and difficulty with both contraction and relaxation.  Pt does seem to have mild sensation impairments to light touch in pelvic floor and around peritoneum. Pt with overall good proximal muscle strength.    Pt will benefit from skilled physical therapy to address above pelvic floor impairments.      Subjective: Pt reports her whole life she has had urgency issues. Pt reports having both bowel and bladder issues but bowel issues seemed to have improved. She has had difficulty controlling gas.   DOI/onset: 6/10/2018-childbirth    Mechanism of injury: childbirth    DOS NA   Related PMH: Herniated disc 5 years ago (happened while sneezing) Previous treatment: PT   Imaging: none    Chief Complaint: urgency, incontinence   Pain: rest 0 /10, activity 0/10  Described as: NA Alleviated by: NA Exacerbated by: NA Progression of symptoms since initial onset: bowel improving, urine staying the same Time of day when pain is worse: none in particular   Sleeping: typically doesn't wake up at night   Social history: 3 month daughter Anette  Occupation: [employer] working in [employer] Job duties: Computer work    Current HEP/exercise regimen: biking for commute, walking   Patient's goals are Manage urgency issues; increased ability to delay urination    Other pertinent PMH: none other than incontinence General health as reported by patient: Good   Return to MD:  prn      Urination:  Do you leak on the way to the bathroom or with a strong urge to void? Yes    Do you leak with cough,sneeze, jumping, running?Yes   Any other activities that cause leaking? No   Do you have triggers that make you feel you can't wait to go to the bathroom? Yes   what are they: Running water, sometimes cold.  Type of pad and number used per day? none  When you leak what is the amount? Small-Medium    How long can you delay the need to urinate? 3 - 10 minutes.   How many times do you get up to urinate at night?  0    Can you stop the flow of urine when on the toilet? No  Is the volume of urine passed usually: average. (8sec rule=  250ml with average bladder storing  400-600ml)    Do you strain to pass urine? No  Do you have a slow or hesitant urinary stream? No  Do you have difficulty initiating the urine stream? No  Is urination painful?  No    How many bladder infections have you had in last 12 months? 0    Fluid intake(one glass is 8oz or one cup) 5 glasses/day, 0caffinated glasses/day  0 alcohol glasses/day.    Bowel habits:  Frequency of bowel movements? 3-4 times a week  Consistancy of stool? hard  Do you ignore the urge to defecate? No  Do you strain to pass stool? No    Pelvic Pain:  Do you have any pelvic pain with intercourse, exams, use of tampons? No-has not had intercourse since childbirth   Is initial penetration during intercourse painful? NA  Is deeper penetration painful? NA  Do you use lubricant?   No       Given birth? Yes Any complications? Yes, 3rd degree tearing   # of vaginal delieveries? 1   # of C-sections? 0  # of episiotomies? 0.  Are you sexually active? Not since delivery   Have you ever been worried for your physical safety? No  Any abdominal or pelvic surgeries? No   Are you having any regular exercise? Yes      MUSCLE PERFORMANCE    Baseline PF tone:hypo  PF Tone with cough: hypo  Valsalva: not tested  PF Response quality: sluggish  PF Power: Center: 1   Endurance:  Maximum contraction in seconds: < 3 seconds  # of endurance contractions before fatigue: NT  Quick contraction repetitions prior to fatigue: Difficulty performing repeated contraction s.  Specificity/accessory muscles: Abdominals, glutes      Hip MMT 9/10/2018 Left Right   Hip Flexion  4+/5 4+/5   Hip Abduction  4/5 4/5   Hip Extension  4-/5 4-/5         PALPATION: Feels pressure but difficulty feeling at what location in PF  0.5 finger width DR above umbilicus    Therapeutic Activity (20 min): Today's session consisted of education regarding pelvic floor muscle anatomy, normal bladder function, urge suppression techniques and/or relaxation techniques as indicated, and instruction in how to complete a bladder diary for assessment next visit.  Pt was instructed in the pathoanatomy of the pelvic floor utilizing pelvic model.  We discussed what pelvic floor physical therapy is, components of exam, and typical patient progression.  Pt was told that she was in control of exam progression, and if at any time was uncomfortable and wished to discontinue we could. Pt involved in session and asking numerous appropriate questions.     NMR (25 mins)   Biofeedback unit used to provide visualization of PF activation in multiple body positions including supine, sidelying, & sitting.  Education provided on building strength & endurance in pt's PF due to pt's low resting tone and symptoms. Pt practicing contractions with verbal, visual, and manual cues. . Trialled contractions in supine only.  Initiated education on proper body mechanics for lifting and the importance of core strength. Discussed activation of TA and performed contraction in both supine and sitting working up to 20-30s holds. Progressed exercises as appropriate.  Pt provided with HEP.      Assessment/Plan:    Patient is a 37 year old female with pelvic complaints.    Patient has the following significant findings with corresponding treatment plan.                 Diagnosis 1:  Mixed Incontinence, urgency   Decreased strength - therapeutic exercise, therapeutic activities and home program  Impaired muscle performance - biofeedback, electric stimulation, neuro re-education and home program  Decreased function - therapeutic activities and home program    Therapy Evaluation Codes:   1) History comprised of:   Personal factors that impact the plan of care:      Past/current experiences and Time since onset of symptoms.    Comorbidity factors that impact the plan of care are:      Bowel/bladder changes.     Medications impacting care: High blood pressure and None.  2) Examination of Body Systems comprised of:   Body structures and functions that impact the plan of care:      Lumbar spine and Pelvis.   Activity limitations that impact the plan of care are:      Lifting, Sports, Fecal incontinence, Frequency, Stress incontinence, Urgency, Urge incontinence and Urinary incontinence.  3) Clinical presentation characteristics are:   Stable/Uncomplicated.  4) Decision-Making    Low complexity using standardized patient assessment instrument and/or measureable assessment of functional outcome.  Cumulative Therapy Evaluation is: Low complexity.    Previous and current functional limitations:  (See Goal Flow Sheet for this information)    Short term and Long term goals: (See Goal Flow Sheet for this information)     Communication ability:  Patient appears to be able to clearly communicate and understand verbal and written communication and follow directions correctly.  Treatment Explanation - The following has been discussed with the patient:   RX ordered/plan of care  Anticipated outcomes  Possible risks and side effects  This patient would benefit from PT intervention to resume normal activities.   Rehab potential is good.    Frequency:  1 X week, once daily  Duration:  for 6  weeks  Discharge Plan:  Achieve all LTG.  Independent in home treatment program.  Reach maximal therapeutic  benefit.    Please refer to the daily flowsheet for treatment today, total treatment time and time spent performing 1:1 timed codes.

## 2018-09-11 PROBLEM — M99.05 SOMATIC DYSFUNCTION OF PELVIC REGION: Status: ACTIVE | Noted: 2018-09-11

## 2018-09-11 PROBLEM — N39.46 MIXED INCONTINENCE: Status: ACTIVE | Noted: 2018-09-11

## 2018-09-21 ENCOUNTER — THERAPY VISIT (OUTPATIENT)
Dept: PHYSICAL THERAPY | Facility: CLINIC | Age: 38
End: 2018-09-21
Payer: COMMERCIAL

## 2018-09-21 DIAGNOSIS — N39.46 MIXED INCONTINENCE: ICD-10-CM

## 2018-09-21 DIAGNOSIS — M99.05 SOMATIC DYSFUNCTION OF PELVIC REGION: Primary | ICD-10-CM

## 2018-09-21 PROCEDURE — 97110 THERAPEUTIC EXERCISES: CPT | Mod: GP | Performed by: PHYSICAL THERAPIST

## 2018-09-21 PROCEDURE — 97112 NEUROMUSCULAR REEDUCATION: CPT | Mod: GP | Performed by: PHYSICAL THERAPIST

## 2018-10-01 ENCOUNTER — THERAPY VISIT (OUTPATIENT)
Dept: PHYSICAL THERAPY | Facility: CLINIC | Age: 38
End: 2018-10-01
Payer: COMMERCIAL

## 2018-10-01 DIAGNOSIS — M99.05 SOMATIC DYSFUNCTION OF PELVIC REGION: Primary | ICD-10-CM

## 2018-10-01 DIAGNOSIS — N39.46 MIXED INCONTINENCE: ICD-10-CM

## 2018-10-01 PROCEDURE — 97110 THERAPEUTIC EXERCISES: CPT | Mod: GP | Performed by: PHYSICAL THERAPIST

## 2018-10-01 PROCEDURE — 97140 MANUAL THERAPY 1/> REGIONS: CPT | Mod: GP | Performed by: PHYSICAL THERAPIST

## 2018-10-01 PROCEDURE — 97112 NEUROMUSCULAR REEDUCATION: CPT | Mod: GP | Performed by: PHYSICAL THERAPIST

## 2018-10-10 ENCOUNTER — THERAPY VISIT (OUTPATIENT)
Dept: PHYSICAL THERAPY | Facility: CLINIC | Age: 38
End: 2018-10-10
Payer: COMMERCIAL

## 2018-10-10 DIAGNOSIS — M99.05 SOMATIC DYSFUNCTION OF PELVIC REGION: ICD-10-CM

## 2018-10-10 DIAGNOSIS — N39.46 MIXED INCONTINENCE: ICD-10-CM

## 2018-10-10 PROCEDURE — 97112 NEUROMUSCULAR REEDUCATION: CPT | Mod: GP | Performed by: PHYSICAL THERAPIST

## 2018-10-10 PROCEDURE — 97110 THERAPEUTIC EXERCISES: CPT | Mod: GP | Performed by: PHYSICAL THERAPIST

## 2018-10-11 ENCOUNTER — ALLIED HEALTH/NURSE VISIT (OUTPATIENT)
Dept: NURSING | Facility: CLINIC | Age: 38
End: 2018-10-11
Payer: COMMERCIAL

## 2018-10-11 DIAGNOSIS — Z23 NEED FOR PROPHYLACTIC VACCINATION AND INOCULATION AGAINST INFLUENZA: Primary | ICD-10-CM

## 2018-10-11 PROCEDURE — 99207 ZZC NO CHARGE NURSE ONLY: CPT

## 2018-10-11 PROCEDURE — 90471 IMMUNIZATION ADMIN: CPT

## 2018-10-11 PROCEDURE — 90686 IIV4 VACC NO PRSV 0.5 ML IM: CPT

## 2018-10-11 NOTE — MR AVS SNAPSHOT
After Visit Summary   10/11/2018    Moon Ordoñez    MRN: [id number]           Patient Information     Date Of Birth          1980        Visit Information        Provider Department      10/11/2018 3:20 PM CARE COORDINATOR [facility]        Today's Diagnoses     Need for prophylactic vaccination and inoculation against influenza    -  1       Follow-ups after your visit        Your next 10 appointments already scheduled     Oct 24, 2018 11:30 AM CDT   DOROTHY For Women Only with Gabrielle Maldonado PT   [facility] ([facility]  )    [address] [address]   [phone]            Nov 07, 2018  4:00 PM CST   DOROTHY For Women Only with Gabrielle Maldonado PT   [facility] ([facility]  )    [address] [id number] [phone]              Who to contact     If you have questions or need follow up information about today's clinic visit or your schedule please contact [facility] directly at [phone].  Normal or non-critical lab and imaging results will be communicated to you by LeKioskhart, letter or phone within 4 business days after the clinic has received the results. If you do not hear from us within 7 days, please contact the clinic through LeKioskhart or phone. If you have a critical or abnormal lab result, we will notify you by phone as soon as possible.  Submit refill requests through [employer] Nation or call your pharmacy and they will forward the refill request to us. Please allow 3 business days for your refill to be completed.          Additional Information About Your Visit        MyChart Information     [employer] gives you secure access to your electronic health record. If you see a primary care provider, you can also send messages to your care team and make appointments. If you have  questions, please call your primary care clinic.  If you do not have a primary care provider, please call [phone] and they will assist you.        Care EveryWhere ID     This is your Care EveryWhere ID. This could be used by other organizations to access your [place] medical records  [id number]         Blood Pressure from Last 3 Encounters:   08/08/18 112/69   07/31/18 115/70   06/12/18 134/78    Weight from Last 3 Encounters:   08/08/18 142 lb 11.2 oz (64.7 kg)   07/31/18 144 lb 4.8 oz (65.5 kg)   06/09/18 172 lb (78 kg)              We Performed the Following     FLU VACCINE, SPLIT VIRUS, IM (QUADRIVALENT) [90997]- >3 YRS     Vaccine Administration, Initial [44116]        Primary Care Provider Office Phone # Fax #    Marychuy Murphy [phone]       [facility] [address] [address]        Equal Access to Services     GILL QUICK : Hadii aad ku hadasho Soomaali, waaxda luqadaha, qaybta kaalmada adeegyada, waxay helenein hayjaylan lilly shen . So [facility] [phone].    ATENCIÓN: Si habla español, tiene a benitez disposición servicios gratuitos de asistencia lingüística. [facility] [phone].    We comply with applicable federal civil rights laws and [place] laws. We do not discriminate on the basis of race, color, national origin, age, disability, sex, sexual orientation, or gender identity.            Thank you!     Thank you for choosing [facility]  for your care. Our goal is always to provide you with excellent care. Hearing back from our patients is one way we can continue to improve our services. Please take a few minutes to complete the written survey that you may receive in the mail after your visit with us. Thank you!             Your Updated Medication List - Protect others around you: Learn how to safely use, store and throw away your medicines at www.disposemymeds.org.          This list is accurate as of 10/11/18   3:44 PM.  Always use your most recent med list.                   Brand Name Dispense Instructions for use Diagnosis    levonorgestrel 20 MCG/24HR IUD    MIRENA (52 MG)    1 each    1 each (20 mcg) by Intrauterine route once for 1 dose    Encounter for IUD insertion       Prenatal Vitamin 27-0.8 MG Tabs

## 2018-10-11 NOTE — PROGRESS NOTES

## 2018-10-24 ENCOUNTER — THERAPY VISIT (OUTPATIENT)
Dept: PHYSICAL THERAPY | Facility: CLINIC | Age: 38
End: 2018-10-24
Payer: COMMERCIAL

## 2018-10-24 DIAGNOSIS — M99.05 SOMATIC DYSFUNCTION OF PELVIC REGION: ICD-10-CM

## 2018-10-24 DIAGNOSIS — N39.46 MIXED INCONTINENCE: ICD-10-CM

## 2018-10-24 PROCEDURE — 97140 MANUAL THERAPY 1/> REGIONS: CPT | Mod: GP | Performed by: PHYSICAL THERAPIST

## 2018-10-24 PROCEDURE — 97112 NEUROMUSCULAR REEDUCATION: CPT | Mod: GP | Performed by: PHYSICAL THERAPIST

## 2018-11-27 ENCOUNTER — THERAPY VISIT (OUTPATIENT)
Dept: PHYSICAL THERAPY | Facility: CLINIC | Age: 38
End: 2018-11-27
Payer: COMMERCIAL

## 2018-11-27 DIAGNOSIS — M99.05 SOMATIC DYSFUNCTION OF PELVIC REGION: ICD-10-CM

## 2018-11-27 DIAGNOSIS — N39.46 MIXED INCONTINENCE: ICD-10-CM

## 2018-11-27 PROCEDURE — 97530 THERAPEUTIC ACTIVITIES: CPT | Mod: GP | Performed by: PHYSICAL THERAPIST

## 2018-11-27 PROCEDURE — 97140 MANUAL THERAPY 1/> REGIONS: CPT | Mod: GP | Performed by: PHYSICAL THERAPIST

## 2018-11-27 PROCEDURE — 97112 NEUROMUSCULAR REEDUCATION: CPT | Mod: GP | Performed by: PHYSICAL THERAPIST

## 2018-11-27 NOTE — MR AVS SNAPSHOT
After Visit Summary   11/27/2018    Moon Ordoñez    MRN: [id number]           Patient Information     Date Of Birth          1980        Visit Information        Provider Department      11/27/2018 1:30 PM Gabrielle Maldonado PT [facility]        Today's Diagnoses     Mixed incontinence        Somatic dysfunction of pelvic region           Follow-ups after your visit        Who to contact     If you have questions or need follow up information about today's clinic visit or your schedule please contact [facility] directly at [phone].  Normal or non-critical lab and imaging results will be communicated to you by Plum Babyhart, letter or phone within 4 business days after the clinic has received the results. If you do not hear from us within 7 days, please contact the clinic through BRIKA or phone. If you have a critical or abnormal lab result, we will notify you by phone as soon as possible.  Submit refill requests through BRIKA or call your pharmacy and they will forward the refill request to us. Please allow 3 business days for your refill to be completed.          Additional Information About Your Visit        MyChart Information     BRIKA gives you secure access to your electronic health record. If you see a primary care provider, you can also send messages to your care team and make appointments. If you have questions, please call your primary care clinic.  If you do not have a primary care provider, please call [phone] and they will assist you.        Care EveryWhere ID     This is your Care EveryWhere ID. This could be used by other organizations to access your [place] medical records  [id number]         Blood Pressure from Last 3 Encounters:   08/08/18 112/69   07/31/18 115/70   06/12/18 134/78    Weight from Last 3 Encounters:   08/08/18 64.7 kg (142 lb 11.2 oz)   07/31/18 65.5 kg (144 lb 4.8 oz)    06/09/18 78 kg (172 lb)              We Performed the Following     DOROTHY PROGRESS NOTES REPORT     MANUAL THER TECH,1+REGIONS,EA 15 MIN     NEUROMUSCULAR RE-EDUCATION     THERAPEUTIC ACTIVITIES        Primary Care Provider Office Phone # Fax #    Marychuy Murphy [phone]       [facility] [address] [address]        Equal Access to Services     [facility]: Hadii aad ku hadasho Soomaali, waaxda luqadaha, qaybta kaalmada adeegyada, waxay idiin hayaan adeeg kharash la'aan . So [facility] [phone].    ATENCIÓN: Si habla español, tiene a benitez disposición servicios gratuitos de asistencia lingüística. Nae al [phone].    We comply with applicable federal civil rights laws and [place] laws. We do not discriminate on the basis of race, color, national origin, age, disability, sex, sexual orientation, or gender identity.            Thank you!     Thank you for choosing [facility]  for your care. Our goal is always to provide you with excellent care. Hearing back from our patients is one way we can continue to improve our services. Please take a few minutes to complete the written survey that you may receive in the mail after your visit with us. Thank you!             Your Updated Medication List - Protect others around you: Learn how to safely use, store and throw away your medicines at www.disposemymeds.org.          This list is accurate as of 11/27/18  9:27 PM.  Always use your most recent med list.                   Brand Name Dispense Instructions for use Diagnosis    levonorgestrel 20 MCG/24HR IUD    MIRENA (52 MG)    1 each    1 each (20 mcg) by Intrauterine route once for 1 dose    Encounter for IUD insertion       Prenatal Vitamin 27-0.8 MG Tabs

## 2018-11-28 NOTE — PROGRESS NOTES
[facility]  System  Physical Exam  General   ROS    PROGRESS  REPORT    Progress reporting period is from 9/10/2018 to 11/27/2018.       SUBJECTIVE   Subjective: Pt reports that she is getting stronger and improving. She still has issues with holding urine once she gets into the bathroom. Pt reports that she and SO have not had intercourse recently but it was still painful at last attempt.     Changes in function:  Yes (See Goal flowsheet attached for changes in current functional level)  Adverse reaction to treatment or activity: None    OBJECTIVE  Changes noted in objective findings:  Yes, improved incontinence. Pt continues to have pelvic pain and significant sensation/strength deficits on L side of pelvic floor.   Objective: Continues to have significant sensation defitis on the L side. No other LE weakness or sensation issues to light touch noted      ASSESSMENT/PLAN  Updated problem list and treatment plan: Diagnosis 1:  Incontinence  Decreased strength - therapeutic exercise, therapeutic activities and home program  Impaired muscle performance - biofeedback, electric stimulation, neuro re-education and home program  Decreased function - therapeutic activities and home program  STG/LTGs have been met or progress has been made towards goals:  Yes (See Goal flow sheet completed today.)  Assessment of Progress: The patient's condition has potential to improve.  Self Management Plans:  Patient has been instructed in a home treatment program.  I have re-evaluated this patient and find that the nature, scope, duration and intensity of the therapy is appropriate for the medical condition of the patient.  Moon continues to require the following intervention to meet STG and LTG's:  PT    Recommendations:  This patient would benefit from continued therapy.     Frequency:  2 X a month, once daily  Duration:  for 2 months        Please refer to the daily flowsheet for treatment today, total treatment time and time spent  performing 1:1 timed codes.

## 2019-08-01 ENCOUNTER — TELEPHONE (OUTPATIENT)
Dept: MIDWIFE SERVICES | Facility: CLINIC | Age: 39
End: 2019-08-01

## 2019-08-01 NOTE — TELEPHONE ENCOUNTER
Forms received and filled out. Placed in provider's box to be signed. Will fax to [phone] when returned.  Ameena Hudson,

## 2019-08-14 NOTE — PROGRESS NOTES
Patient did not return for further treatment and no additional progress was noted.  Please refer to the progress note and goal flowsheet completed on 11/27/18 for discharge information.

## 2019-08-15 PROBLEM — M99.05 SOMATIC DYSFUNCTION OF PELVIC REGION: Status: RESOLVED | Noted: 2018-09-11 | Resolved: 2018-10-24

## 2019-08-15 PROBLEM — N39.46 MIXED INCONTINENCE: Status: RESOLVED | Noted: 2018-09-11 | Resolved: 2018-10-24

## 2019-10-03 ENCOUNTER — HEALTH MAINTENANCE LETTER (OUTPATIENT)
Age: 39
End: 2019-10-03

## 2019-10-17 ENCOUNTER — IMMUNIZATION (OUTPATIENT)
Dept: NURSING | Facility: CLINIC | Age: 39
End: 2019-10-17
Payer: COMMERCIAL

## 2019-10-17 PROCEDURE — 90686 IIV4 VACC NO PRSV 0.5 ML IM: CPT

## 2019-10-17 PROCEDURE — 90471 IMMUNIZATION ADMIN: CPT

## 2019-10-18 ENCOUNTER — OFFICE VISIT (OUTPATIENT)
Dept: MIDWIFE SERVICES | Facility: CLINIC | Age: 39
End: 2019-10-18
Payer: COMMERCIAL

## 2019-10-18 VITALS
HEIGHT: 68 IN | HEART RATE: 81 BPM | WEIGHT: 129 LBS | DIASTOLIC BLOOD PRESSURE: 71 MMHG | BODY MASS INDEX: 19.55 KG/M2 | SYSTOLIC BLOOD PRESSURE: 115 MMHG

## 2019-10-18 DIAGNOSIS — Z01.419 ENCOUNTER FOR GYNECOLOGICAL EXAMINATION WITHOUT ABNORMAL FINDING: Primary | ICD-10-CM

## 2019-10-18 DIAGNOSIS — N89.8 VAGINAL DRYNESS: ICD-10-CM

## 2019-10-18 PROCEDURE — 99213 OFFICE O/P EST LOW 20 MIN: CPT | Mod: 25 | Performed by: ADVANCED PRACTICE MIDWIFE

## 2019-10-18 PROCEDURE — 99395 PREV VISIT EST AGE 18-39: CPT | Performed by: ADVANCED PRACTICE MIDWIFE

## 2019-10-18 ASSESSMENT — MIFFLIN-ST. JEOR: SCORE: 1318.64

## 2019-10-18 NOTE — PROGRESS NOTES
Moon is a 38 year old  female who presents for annual exam. They are doing really well. Their daughter is 16 months old now and walking and talking and growing well. Lots of fun for them. They continue to be interested in rock climbing and do a lot of camping. They recently found out their dog has cancer, having a hard time with that. They are doing well with work.  Health wise she is doing well. She continues to have pain with intercourse. She completed physical therapy. She is okay with doing the exercises but could do a better job. Has a hard time finding the time and Reyes does not want to do the vaginal massage since it hurts her. She is breastfeeding, 3-4 times per day and pumping once at work. Feels like the vaginal dryness from breastfeeding is not helping. Recommend using estrogen cream to help. Has continued to notice hair loss and weight loss but associates it with breastfeeding. We did discuss that is signs of thyroid as well. Does not want thyroid testing. Will check her thyroid if symptoms continue after breastfeeding. She is due to have her cholesterol checked as well but since she is breastfeeding will hold off. Will plan to come back when she is done breastfeeding. Plans to breastfeed until her daughter decides to wean or about 2 years. Her daughter is starting to lose some interest. She has no other questions or concerns today. Reviewed medical history, unchanged from last year.     Pt is still breastfeeding.  No LMP recorded. (Menstrual status: Breast Feeding).. Using IUD for contraception.  She is not currently considering pregnancy.  Besides routine health maintenance, she has no other health concerns today .  GYNECOLOGIC HISTORY:  Menarche: 15  Age at first intercourse: 22 Number of lifetime partners: 6  Moon is sexually active with 1male partner(s) and is currently in monogamous relationship with .    History sexually transmitted infections:No STD history  STI testing offered?   Declined  CASSANDRA exposure: Unknown  History of abnormal Pap smear: NO - age 30- 65 PAP every 3 years recommended  Family history of breast CA: No  Family history of uterine/ovarian CA: No    Family history of colon CA: Yes (Please explain): MGMO    HEALTH MAINTENANCE:  Cholesterol: (No results found for: CHOL History of abnormal lipids: No  Mammo: no . History of abnormal Mammo: No.  Regular Self Breast Exams: No  Calcium/Vitamin D intake: source:  dairy, dietary supplement(s) Adequate? Yes  TSH: (No results found for: TSH )  Pap; (  Lab Results   Component Value Date    PAP NIL 2018    )    HISTORY:  OB History    Para Term  AB Living   1 1 1 0 0 1   SAB TAB Ectopic Multiple Live Births   0 0 0 0 1      # Outcome Date GA Lbr Basim/2nd Weight Sex Delivery Anes PTL Lv   1 Term 06/10/18 39w4d 13:05 / 04:16 3.742 kg (8 lb 4 oz) F Vag-Spont EPI  BILLY      Complications: Prolonged PROM (>18 hours)      Name: ANDREIA SCHWARTZ,GUERLINE ADAIR      Apgar1: 9  Apgar5: 9     Past Medical History:   Diagnosis Date     NO ACTIVE PROBLEMS      Past Surgical History:   Procedure Laterality Date     NO HISTORY OF SURGERY       Family History   Problem Relation Age of Onset     Osteoporosis Mother      Hyperlipidemia Mother      Hyperlipidemia Father      Thyroid Cancer Sister      Colon Cancer Maternal Grandmother      Cerebrovascular Disease Maternal Grandfather      Hyperlipidemia Paternal Grandfather      Social History     Socioeconomic History     Marital status:      Spouse name: None     Number of children: None     Years of education: None     Highest education level: None   Occupational History     None   Social Needs     Financial resource strain: None     Food insecurity:     Worry: None     Inability: None     Transportation needs:     Medical: None     Non-medical: None   Tobacco Use     Smoking status: Never Smoker     Smokeless tobacco: Never Used   Substance and Sexual Activity     Alcohol use: No  "    Drug use: No     Sexual activity: Yes     Partners: Male     Birth control/protection: None   Lifestyle     Physical activity:     Days per week: None     Minutes per session: None     Stress: None   Relationships     Social connections:     Talks on phone: None     Gets together: None     Attends Restorationism service: None     Active member of club or organization: None     Attends meetings of clubs or organizations: None     Relationship status: None     Intimate partner violence:     Fear of current or ex partner: None     Emotionally abused: None     Physically abused: None     Forced sexual activity: None   Other Topics Concern     None   Social History Narrative     None       Current Outpatient Medications:      Prenatal Vit-Fe Fumarate-FA (PRENATAL VITAMIN) 27-0.8 MG TABS, , Disp: , Rfl:      levonorgestrel (MIRENA, 52 MG,) 20 MCG/24HR IUD, 1 each (20 mcg) by Intrauterine route once for 1 dose, Disp: 1 each, Rfl: 0   No Known Allergies    Past medical, surgical, social and family history were reviewed and updated in EPIC.    ROS:   C:     NEGATIVE for fever, chills, change in weight  I:       NEGATIVE for worrisome rashes, moles or lesions  E:     NEGATIVE for vision changes or irritation  E/M: NEGATIVE for ear, mouth and throat problems  R:     NEGATIVE for significant cough or SOB  CV:   NEGATIVE for chest pain, palpitations or peripheral edema  GI:     NEGATIVE for nausea, abdominal pain, heartburn, or change in bowel habits  :   NEGATIVE for frequency, dysuria, hematuria, vaginal discharge, or irregular bleeding  M:     NEGATIVE for significant arthralgias or myalgia  N:      NEGATIVE for weakness, dizziness or paresthesias  E:      NEGATIVE for temperature intolerance, skin/hair changes  P:      NEGATIVE for changes in mood or affect.    EXAM:  /71   Pulse 81   Ht 1.727 m (5' 8\")   Wt 58.5 kg (129 lb)   BMI 19.61 kg/m     BMI: Body mass index is 19.61 kg/m .  Constitutional: healthy, alert " and no distress  Head: Normocephalic. No masses, lesions, tenderness or abnormalities  Neck: Neck supple. Trachea midline. No adenopathy. Thyroid symmetric, normal size.   Cardiovascular: RRR.   Respiratory: Negative.   Breast: symmetrical and non tender, no masses, nipples everted  Gastrointestinal: Abdomen soft, non-tender, non-distended. No masses, organomegaly.  : Patient requested to defer  Rectal Exam: deferred  Musculoskeletal: extremities normal  Skin: no suspicious lesions or rashes  Psychiatric: Affect appropriate, cooperative,mentation appears normal.     COUNSELING:   Reviewed preventive health counseling, as reflected in patient instructions   reports that she has never smoked. She has never used smokeless tobacco.    Body mass index is 19.61 kg/m .    FRAX Risk Assessment    ASSESSMENT:  38 year old female with satisfactory annual exam  (Z01.419) Encounter for gynecological examination without abnormal finding  (primary encounter diagnosis)    (N89.8) Vaginal dryness  Plan: conjugated estrogens (PREMARIN) 0.625 MG/GM         vaginal cream    Follow up for annual exam or PRN.   DORINDA Nevarez CNM

## 2021-01-15 ENCOUNTER — HEALTH MAINTENANCE LETTER (OUTPATIENT)
Age: 41
End: 2021-01-15

## 2021-09-05 ENCOUNTER — HEALTH MAINTENANCE LETTER (OUTPATIENT)
Age: 41
End: 2021-09-05

## 2021-10-16 ENCOUNTER — IMMUNIZATION (OUTPATIENT)
Dept: PEDIATRICS | Facility: CLINIC | Age: 41
End: 2021-10-16
Payer: COMMERCIAL

## 2021-10-16 PROCEDURE — 90686 IIV4 VACC NO PRSV 0.5 ML IM: CPT

## 2021-10-16 PROCEDURE — 90471 IMMUNIZATION ADMIN: CPT

## 2021-12-16 ENCOUNTER — IMMUNIZATION (OUTPATIENT)
Dept: NURSING | Facility: CLINIC | Age: 41
End: 2021-12-16
Payer: COMMERCIAL

## 2021-12-16 PROCEDURE — 91300 PR COVID VAC PFIZER DIL RECON 30 MCG/0.3 ML IM: CPT

## 2021-12-16 PROCEDURE — 0004A PR COVID VAC PFIZER DIL RECON 30 MCG/0.3 ML IM: CPT

## 2021-12-28 ENCOUNTER — LAB (OUTPATIENT)
Dept: URGENT CARE | Facility: URGENT CARE | Age: 41
End: 2021-12-28
Attending: FAMILY MEDICINE
Payer: COMMERCIAL

## 2021-12-28 ENCOUNTER — E-VISIT (OUTPATIENT)
Dept: URGENT CARE | Facility: URGENT CARE | Age: 41
End: 2021-12-28
Payer: COMMERCIAL

## 2021-12-28 DIAGNOSIS — Z20.822 SUSPECTED COVID-19 VIRUS INFECTION: ICD-10-CM

## 2021-12-28 DIAGNOSIS — Z20.822 SUSPECTED COVID-19 VIRUS INFECTION: Primary | ICD-10-CM

## 2021-12-28 PROCEDURE — U0005 INFEC AGEN DETEC AMPLI PROBE: HCPCS

## 2021-12-28 PROCEDURE — U0003 INFECTIOUS AGENT DETECTION BY NUCLEIC ACID (DNA OR RNA); SEVERE ACUTE RESPIRATORY SYNDROME CORONAVIRUS 2 (SARS-COV-2) (CORONAVIRUS DISEASE [COVID-19]), AMPLIFIED PROBE TECHNIQUE, MAKING USE OF HIGH THROUGHPUT TECHNOLOGIES AS DESCRIBED BY CMS-2020-01-R: HCPCS

## 2021-12-28 PROCEDURE — 99421 OL DIG E/M SVC 5-10 MIN: CPT | Performed by: FAMILY MEDICINE

## 2021-12-28 NOTE — PATIENT INSTRUCTIONS
Moon,      Based on your responses, you may have coronavirus (COVID-19). This illness can cause fever, cough and trouble breathing. Many people get a mild case and get better on their own. Some people can get very sick.    Will I be tested for COVID-19?  We would like to test you for COVID-19 virus. I have placed orders for this test.     To schedule: go to your [facility] home page and scroll down to the section that says  You have an appointment that needs to be scheduled  and click the large green button that says  Schedule Now  and follow the steps to find the next available openings.    If you are unable to complete these [facility] scheduling steps, please call [phone] to schedule your testing.     Return to work/school/ guidance:  Please let your workplace manager and staffing office know when your isolation ends.       If you receive a positive COVID-19 test result, follow the guidance of the those who are giving you the results. Usually the return to work is 10 days from symptom onset or positive test date, (or in some cases 20 days if you are immunocompromised). If your symptoms started after your positive test, the 10 days should start when your symptoms started.   o If you work at [employer], you must also be cleared by Employee Occupational Health and Safety to return to work.      If you receive a negative COVID-19 test result and did not have a high risk exposure to someone with a known positive COVID-19 test, you can return to work once you're free of fever for 24 hours without fever-reducing medication and your symptoms are improving or resolved.    If you receive a negative COVID-19 test and had a high-risk exposure to someone who has tested positive for COVID-19 then you can return to work 14 days after your last contact with the positive individual. Follow quarantine guidance given by your doctor or public health officials.     Sign up for GetWell Loop:  We know it's scary to hear  that you might have COVID-19. We want to track your symptoms to make sure you're okay over the next 2 weeks. Please look for an email from [employer]--this is a free, online program that we'll use to keep in touch. To sign up, follow the link in the email you will receive. Learn more at [email or web address]    How can I take care of myself?  Over the counter medications may help with your symptoms like congestion, cough, chills, or fever.     There are not many effective prescription treatments for early COVID-19. Hydroxychloroquine, ivermectin, and azithromycin are not effective or recommended for COVID-19.    If your symptoms started in the last 10 days, you may be able to receive a treatment with monoclonal antibodies. This treatment can lower your risk of severe illness and going to the hospital. It is given through an IV or under your skin (subcutaneous) and must be given at an infusion center. You must be 12 or older, weight at least 88 pounds, and have a positive COVID-19 test.     If you would like to sign up to be considered to receive the monoclonal antibody medicine, please complete a participation form through the [facility] here: MNRAP ([email or web address]). You may also call the [facility] COVID-19 Public Hotline at 1-416.963.5436 (open Mon-Fri: 9am-7pm and Sat: 10am-6pm).     Not all people who are eligible will receive the medicine, since supply is limited. You will be contacted in the next 1 to 2 business days only if you are selected. If you do not receive a call, you have not been selected to receive the medicine. If you have any questions about this medication, please contact your primary care provider. For more information, see [email or web address]      Get lots of rest. Drink extra fluids (unless a doctor has told you not to)    Take Tylenol (acetaminophen) or ibuprofen for fever or  pain. If you have liver or kidney problems, ask your family doctor if it's okay to take Tylenol o ibuprofen    Take over the counter medications for your symptoms, as directed by your doctor. You may also talk to your pharmacist.      If you have other health problems (like cancer, heart failure, an organ transplant or severe kidney disease): Call your specialty clinic if you don't feel better in the next 2 days.    Know when to call 911. Emergency warning signs include:  o Trouble breathing or shortness of breath  o Pain or pressure in the chest that doesn't go away  o Feeling confused like you haven't felt before, or not being able to wake up  o Bluish-colored lips or face    Where can I get more information?     [facility] - About COVID-19: [email or web address]     CDC - What to Do If You're Sick:     www.cdc.gov/coronavirus/2019-ncov/about/steps-when-sick.html    CDC - Ending Home Isolation:  https://www.cdc.gov/coronavirus/2019-ncov/your-health/quarantine-isolation.html    CDC - Caring for Someone:  www.cdc.gov/coronavirus/2019-ncov/if-you-are-sick/care-for-someone.html    [facility] clinical trials (COVID-19 research studies): clinicalaffairs.[facility]-clinical-trials    Below are the COVID-19 hotlines at the [place] Department of Health ([facility]). Interpreters are available.  o For health questions: Call 430-875-0140 or 1-266.113.2940 (7 a.m. to 7 p.m.)  o For questions about schools and childcare: Call 312-999-0212 or 1-451.462.4221 (7 a.m. to 7 p.m.)  December 28, 2021  RE:  Moon Hudson Smita                                                                                                                  [address] [place] [address]      To whom it may concern:    I evaluated Moon Ordoñez on December 28, 2021. Moon Ordoñez should be excused from work/school.     They should let their workplace manager and staffing office know when  their quarantine ends.    We can not give an exact date as it depends on the information below. They can calculate this on their own or work with their manager/staffing office to calculate this. (For example if they were exposed on 10/04, they would have to quarantine for 14 full days. That would be through 10/18. They could return on 10/19.)    Quarantine Guidelines:    If patient receives a positive COVID-19 test result, they should follow the guidance of those who are giving the results. Usually the return to work is 10 (or in some cases 20 days from symptom onset.) If they work at [employer], they must be cleared by Employee Occupational Health and Safety to return to work.      If patient receives a negative COVID-19 test result and did not have a high risk exposure to someone with a known positive COVID-19 test, they can return to work once they're free of fever for 24 hours without fever-reducing medication and their symptoms are improving or resolved.    If patient receives a negative COVID-19 test and if they had a high risk exposure to someone who has tested positive for COVID-19 then they can return to work 14 days after their last contact with the positive individual    Note: If there is ongoing exposure to the covid positive person, this quarantine period may be longer than 14 days. (For example, if they are continually exposed to their child, who tested positive and cannot isolate from them, then the quarantine of 7-14 days can't start until their child is no longer contagious. This is typically 10 days from onset to the child's symptoms. So the total duration may be 17-24 days in this case.)     Sincerely,  Dago Agudelo DO          o

## 2021-12-29 LAB — SARS-COV-2 RNA RESP QL NAA+PROBE: NEGATIVE

## 2022-02-20 ENCOUNTER — HEALTH MAINTENANCE LETTER (OUTPATIENT)
Age: 42
End: 2022-02-20

## 2022-03-01 ENCOUNTER — OFFICE VISIT (OUTPATIENT)
Dept: FAMILY MEDICINE | Facility: CLINIC | Age: 42
End: 2022-03-01
Payer: COMMERCIAL

## 2022-03-01 VITALS
BODY MASS INDEX: 22.73 KG/M2 | TEMPERATURE: 99 F | SYSTOLIC BLOOD PRESSURE: 118 MMHG | WEIGHT: 150 LBS | OXYGEN SATURATION: 100 % | DIASTOLIC BLOOD PRESSURE: 79 MMHG | HEIGHT: 68 IN | HEART RATE: 87 BPM

## 2022-03-01 DIAGNOSIS — Z00.00 ROUTINE GENERAL MEDICAL EXAMINATION AT A HEALTH CARE FACILITY: Primary | ICD-10-CM

## 2022-03-01 DIAGNOSIS — Z11.59 NEED FOR HEPATITIS C SCREENING TEST: ICD-10-CM

## 2022-03-01 DIAGNOSIS — N39.3 FEMALE STRESS INCONTINENCE: ICD-10-CM

## 2022-03-01 PROCEDURE — 99386 PREV VISIT NEW AGE 40-64: CPT | Performed by: NURSE PRACTITIONER

## 2022-03-01 ASSESSMENT — PAIN SCALES - GENERAL: PAINLEVEL: NO PAIN (0)

## 2022-03-01 NOTE — PROGRESS NOTES
SUBJECTIVE:   CC: Moon Ordoñez is an 41 year old woman who presents for preventive health visit.       Patient has been advised of split billing requirements and indicates understanding: Yes  Healthy Habits:     Getting at least 3 servings of Calcium per day:  Yes    Bi-annual eye exam:  NO    Dental care twice a year:  Yes    Sleep apnea or symptoms of sleep apnea:  None    Diet:  Regular (no restrictions)    Frequency of exercise:  1 day/week    Duration of exercise:  15-30 minutes    Taking medications regularly:  Not Applicable    Medication side effects:  Not applicable    PHQ-2 Total Score: 0    Additional concerns today:  No      After her menses came back her pelvic floor issues came back slight stress leakage just annoying no pain or any new symptoms   Had been doing kegels in bed maybe not as much now  Has an IUD so menses light  Due to get pap and IUD changed next year      Today's PHQ-2 Score:   PHQ-2 ( 1999 Pfizer) 2/28/2022   Q1: Little interest or pleasure in doing things 0   Q2: Feeling down, depressed or hopeless 0   PHQ-2 Score 0   PHQ-2 Total Score (12-17 Years)- Positive if 3 or more points; Administer PHQ-A if positive -   Q1: Little interest or pleasure in doing things Not at all   Q2: Feeling down, depressed or hopeless Not at all   PHQ-2 Score 0       Abuse: Current or Past (Physical, Sexual or Emotional) - No  Do you feel safe in your environment? Yes    Have you ever done Advance Care Planning? (For example, a Health Directive, POLST, or a discussion with a medical provider or your loved ones about your wishes): No, advance care planning information given to patient to review.  Patient declined advance care planning discussion at this time.    Social History     Tobacco Use     Smoking status: Never Smoker     Smokeless tobacco: Never Used   Substance Use Topics     Alcohol use: No     If you drink alcohol do you typically have >3 drinks per day or >7 drinks per week?  No    Alcohol Use 2/28/2022   Prescreen: >3 drinks/day or >7 drinks/week? No   No flowsheet data found.    Reviewed orders with patient.  Reviewed health maintenance and updated orders accordingly - Yes  Labs reviewed in EPIC  BP Readings from Last 3 Encounters:   03/01/22 118/79   10/18/19 115/71   08/08/18 112/69    Wt Readings from Last 3 Encounters:   03/01/22 68 kg (150 lb)   10/18/19 58.5 kg (129 lb)   08/08/18 64.7 kg (142 lb 11.2 oz)                  Patient Active Problem List   Diagnosis     Screening for malignant neoplasm of cervix     Need for Tdap vaccination     AMA (advanced maternal age) multigravida 35+     Elevated blood pressure reading without diagnosis of hypertension     Labor and delivery, indication for care     Female stress incontinence     Past Surgical History:   Procedure Laterality Date     NO HISTORY OF SURGERY         Social History     Tobacco Use     Smoking status: Never Smoker     Smokeless tobacco: Never Used   Substance Use Topics     Alcohol use: No     Family History   Problem Relation Age of Onset     Osteoporosis Mother      Hyperlipidemia Mother      Hyperlipidemia Father      Thyroid Cancer Sister      Colon Cancer Maternal Grandmother      Cerebrovascular Disease Maternal Grandfather      Hyperlipidemia Paternal Grandfather          No Known Allergies    Breast Cancer Screening:    Breast CA Risk Assessment (FHS-7) 2/28/2022   Do you have a family history of breast, colon, or ovarian cancer? No / Unknown         Mammogram Screening - Offered annual screening and updated Health Maintenance for mutual plan based on risk factor consideration    Pertinent mammograms are reviewed under the imaging tab.    History of abnormal Pap smear: NO - age 30-65 PAP every 5 years with negative HPV co-testing recommended  PAP / HPV Latest Ref Rng & Units 8/8/2018   PAP (Historical) - NIL   HPV16 NEG:Negative Negative   HPV18 NEG:Negative Negative   HRHPV NEG:Negative Negative     Reviewed  "and updated as needed this visit by clinical staff                  Reviewed and updated as needed this visit by Provider                 Past Medical History:   Diagnosis Date     NO ACTIVE PROBLEMS       Past Surgical History:   Procedure Laterality Date     NO HISTORY OF SURGERY       OB History    Para Term  AB Living   1 1 1 0 0 1   SAB IAB Ectopic Multiple Live Births   0 0 0 0 1      # Outcome Date GA Lbr Basim/2nd Weight Sex Delivery Anes PTL Lv   1 Term 06/10/18 39w4d 13:05 / 04:16 3.742 kg (8 lb 4 oz) F Vag-Spont EPI  BILLY      Complications: Prolonged PROM (>18 hours)      Name: GUERLINE VILLA      Apgar1: 9  Apgar5: 9       Review of Systems  Constitutional, eye, ENT, skin, breast, respiratory, cardiac, GI, , MSK, neuro, psych, and allergy are normal except as otherwise noted.       OBJECTIVE:   /79 (BP Location: Right arm, Patient Position: Chair, Cuff Size: Adult Regular)   Pulse 87   Temp 99  F (37.2  C) (Temporal)   Ht 1.727 m (5' 8\")   Wt 68 kg (150 lb)   SpO2 100%   BMI 22.81 kg/m    Physical Exam  GENERAL: healthy, alert and no distress  EYES: Eyes grossly normal to inspection, PERRL and conjunctivae and sclerae normal  HENT: ear canals and TM's normal, nose and mouth without ulcers or lesions  NECK: no adenopathy, no asymmetry, masses, or scars and thyroid normal to palpation  RESP: lungs clear to auscultation - no rales, rhonchi or wheezes  BREAST: normal without masses, tenderness or nipple discharge and no palpable axillary masses or adenopathy  CV: regular rate and rhythm, normal S1 S2, no S3 or S4, no murmur, click or rub, no peripheral edema and peripheral pulses strong  ABDOMEN: soft, nontender, no hepatosplenomegaly, no masses and bowel sounds normal  MS: no gross musculoskeletal defects noted, no edema  SKIN: no suspicious lesions or rashes, couple moles on back she has been watching an unchanged, saw Derm in past   NEURO: Normal strength and tone, " "mentation intact and speech normal  PSYCH: mentation appears normal, affect normal/bright    Diagnostic Test Results:  Labs reviewed in Epic    ASSESSMENT/PLAN:       ICD-10-CM    1. Routine general medical examination at a health care facility  Z00.00    2. Need for hepatitis C screening test  Z11.59 CANCELED: Hepatitis C Screen Reflex to HCV RNA Quant and Genotype   3. Female stress incontinence  N39.3 Physical Therapy Referral     Start D       COUNSELING:  Reviewed preventive health counseling, as reflected in patient instructions    Estimated body mass index is 19.61 kg/m  as calculated from the following:    Height as of 10/18/19: 1.727 m (5' 8\").    Weight as of 10/18/19: 58.5 kg (129 lb).        She reports that she has never smoked. She has never used smokeless tobacco.      Counseling Resources:  ATP IV Guidelines  Pooled Cohorts Equation Calculator  Breast Cancer Risk Calculator  BRCA-Related Cancer Risk Assessment: FHS-7 Tool  FRAX Risk Assessment  ICSI Preventive Guidelines  Dietary Guidelines for Americans, 2010  USDA's MyPlate  ASA Prophylaxis  Lung CA Screening    DORINDA Rodriguez CNP  [facility]  "

## 2022-04-05 ENCOUNTER — THERAPY VISIT (OUTPATIENT)
Dept: PHYSICAL THERAPY | Facility: CLINIC | Age: 42
End: 2022-04-05
Payer: COMMERCIAL

## 2022-04-05 DIAGNOSIS — M62.89 PELVIC FLOOR DYSFUNCTION: ICD-10-CM

## 2022-04-05 DIAGNOSIS — N39.3 FEMALE STRESS INCONTINENCE: ICD-10-CM

## 2022-04-05 PROCEDURE — 97535 SELF CARE MNGMENT TRAINING: CPT | Mod: GP | Performed by: PHYSICAL THERAPIST

## 2022-04-05 PROCEDURE — 97530 THERAPEUTIC ACTIVITIES: CPT | Mod: GP | Performed by: PHYSICAL THERAPIST

## 2022-04-05 PROCEDURE — 97112 NEUROMUSCULAR REEDUCATION: CPT | Mod: GP | Performed by: PHYSICAL THERAPIST

## 2022-04-05 PROCEDURE — 97161 PT EVAL LOW COMPLEX 20 MIN: CPT | Mod: GP | Performed by: PHYSICAL THERAPIST

## 2022-04-05 NOTE — PROGRESS NOTES
Physical Therapy Initial Evaluation  Subjective:  The history is provided by the patient. No  was used.   Patient Health History  Moon Ordoñez being seen for Stress Incontinence following child birth.     Problem began: 2021.   Problem occurred: Incontinence became much worse once periods returned.   Pain is reported as 0/10 on pain scale.  General health as reported by patient is good.  Pertinent medical history includes: changes in bowel/bladder.     Medical allergies: none.   Surgeries include:  None.    Current medications:  None.    Current occupation is government.   Primary job tasks include:  Computer work and prolonged sitting.                History of current episode:    Onset date/MD order: Female stress incontinence 3/1/22.    CC/Present symptoms: Patient reports that she had urinary incontinence following childbirth in 2018.  Last January when her menstration came back, she had worsening urinary incontinence.  She did get IUD in  and menstration started 2021.  Breast fed for awhile and started to become painful in the January time frame and milk supply has decreased.  Incontinence is with running, jumping, how she is standing, picking daughter up, or pulling car seat strap.  Incontinence is happening daily, usually multiple times per day.  Every time she runs she has urinary leakage.  This has been unchanging since about 2021.   Has never really been able to feel the contract/relax with a kegel, did feel it with e-stim.  Pt reports that she has some pain with intercourse that may make it hesitant to have sexual intercourse, but does not hurt and have to stop.  She has some times when a tiny amount of urine will leak with an activity so she goes to bathroom and not much comes out.     HPI/SMHx/Childbirth hx::.  5 years old.  Pregnancy was fine, no urinary leakage.   Childbirth process was fine, lasted 30 hours and pushed for 2 hours.  She  had third degree tear with delivery, feels like she was better but had more pain after finishing lactation.    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  Was doing HIIT exercises and she was doing some for pelvic floor but didn't help.  She had urinary leakage with high knees, would have to stop to avoid fully leaking.    Goals: Be able to run/play with my daugher without worrying about leaking  Red flags:None    Urination:  Do you leak on the way to the bathroom or with a strong urge to void? Yes, less common; usually if she knew she had to go to the bathroom for awhile and postpones   Do you leak with cough,sneeze, jumping, running? Yes,  Any other activities that cause leaking?  Heavy lifting or strain   Do you have triggers that make you feel you can't wait to go to the bathroom?  What are they? no.  Type of pad and number used per day? no  When you leak what is the amount? Small-medium (most of the time it is not that much and can't or won't stop it) Probably changes underwear once per day and has dribbles probably 3-4 times per day  How long can you delay the need to urinate? [place] end of average, 10 minutes.   Do you feel excessive pressure in pelvic floor:no.  When?   Frequency of daytime urination:once every 3 hours, 5 times per day  Frequency of nighttime urination:0  Can you stop the flow of urine when on the toilet? no  Is the volume of urine passed usually:  (8sec rule= 250ml with average bladder storing 400-600ml) medium  Do you strain to pass urine? no  Do you have a slow or hesitant urinary stream? no  Do you have difficulty initiating the urine stream? no  Is urination painful? no  How many bladder infections have you had in last 12  months?0  Fluid intake(one glass is 8oz or one cup) 6 combine liquid, 8-16 ounces straight water glasses/day, 0caffinated glasses/day  0 alcohol glasses/day.  Bowel habits:  Frequency of bowel movements? 3 times a week  Consistancy of stool?  Humphreys Stool Scale 2-3  Do you ignore the urge to defecate? no  Do you strain to pass stool? no  Pelvic Pain:  Do you have any pelvic pain with intercourse, exams, use of tampons? unsure  Is initial penetration during intercourse painful? no  Is deeper penetration painful? Sometimes the initial push in is painful  Do you use lubricant?  Yes  Are you sexually active? yes  Have you ever been worried for your physical safety? no  Have you practiced the PF(kegel) exercises for 4 or more weeks?no  Marinoff Scale:Level 2  (Level 3: Abstinence from intercourse because of severe pain. Level 2: Painful intercourse which limites frequency of activity. Level 1: Painful intercourse not severe enough to prevent activity.)    OBJECTIVE:      Treatment/Education provided this session: please see flow sheet for details    ASSESSMENT/PLAN:    Patient is a 41 year old female with pelvic floor complaints.    Patient has the following significant findings with corresponding treatment plan.                Diagnosis 1:  Mixed urinary incontinence  -  manual therapy, self management, education, directional preference exercise and home program  Decreased ROM/flexibility - manual therapy and therapeutic exercise  Decreased joint mobility - manual therapy and therapeutic exercise  Decreased strength - therapeutic exercise and therapeutic activities  Decreased proprioception - neuro re-education and therapeutic activities  Impaired gait - gait training  Impaired muscle performance - neuro re-education  Decreased function - therapeutic activities  Impaired posture - neuro re-education    Previous and current functional limitations:  (See Goal Flow Sheet for this information)    Short term and Long term  goals: (See Goal Flow Sheet for this information)     Communication ability:  Patient appears to be able to clearly communicate and understand verbal and written communication and follow directions correctly.  Treatment Explanation - The following has been discussed with the patient:   RX ordered/plan of care  Anticipated outcomes  Possible risks and side effects  This patient would benefit from PT intervention to resume normal activities.   Rehab potential is good.    Frequency:  1X week, once daily  Duration:  for 8 weeks  Discharge Plan:  Achieve all LTG.  Independent in home treatment program.  Reach maximal therapeutic benefit.    Please refer to the daily flowsheet for treatment today, total treatment time and time spent performing 1:1 timed codes.                     Objective:  System         Lumbar/SI Evaluation  ROM:  AROM Lumbar: normal                                                Pelvic Dysfunction Evaluation:        Flexibility:      Tightness not present at:  Adductors; Iliopsoas; Hamstrings; Piriformis or Gluteals    Abdominal Wall:    Diastasis Recti:  Normal  Trigger Points:  NA    Pelvic Clock Exam:    Ischiocavernosis pain:  -  Bulbocavernosis pain:  -  Transverse Perineal:  -  Levator ANI:  +  Perineal Body:  -      External Assessment:    Skin Condition:  Normal  Scars:  Painful  Bearing Down/Coughing:  Normal  Tissue Symmetry:  Normal  Introitus:  Normal  Muscle Contraction/Perineal Mobility:  No movement  Internal Assessment:  Internal assessment pelvic: Some difficulty feeling q-tip at labia minora.    Contraction/Grade:  No contraction (0) and fllicker muscles stretched (1)        Symmetry of Contraction Response:  Difficult to feel PFM contraction             Hip Evaluation  HIP AROM:  AROM:    Left Hip:     Normal    Right Hip:   Normal                                     General     ROS

## 2022-04-07 PROBLEM — M62.89 PELVIC FLOOR DYSFUNCTION: Status: ACTIVE | Noted: 2022-04-07

## 2022-04-12 ENCOUNTER — THERAPY VISIT (OUTPATIENT)
Dept: PHYSICAL THERAPY | Facility: CLINIC | Age: 42
End: 2022-04-12
Attending: NURSE PRACTITIONER
Payer: COMMERCIAL

## 2022-04-12 DIAGNOSIS — M62.89 PELVIC FLOOR DYSFUNCTION: Primary | ICD-10-CM

## 2022-04-12 PROCEDURE — 97535 SELF CARE MNGMENT TRAINING: CPT | Mod: GP | Performed by: PHYSICAL THERAPIST

## 2022-04-12 PROCEDURE — 97110 THERAPEUTIC EXERCISES: CPT | Mod: GP | Performed by: PHYSICAL THERAPIST

## 2022-04-12 PROCEDURE — 97112 NEUROMUSCULAR REEDUCATION: CPT | Mod: GP | Performed by: PHYSICAL THERAPIST

## 2022-04-19 ENCOUNTER — THERAPY VISIT (OUTPATIENT)
Dept: PHYSICAL THERAPY | Facility: CLINIC | Age: 42
End: 2022-04-19
Attending: NURSE PRACTITIONER
Payer: COMMERCIAL

## 2022-04-19 DIAGNOSIS — M62.89 PELVIC FLOOR DYSFUNCTION: Primary | ICD-10-CM

## 2022-04-19 PROCEDURE — 97110 THERAPEUTIC EXERCISES: CPT | Mod: GP | Performed by: PHYSICAL THERAPIST

## 2022-04-19 PROCEDURE — 97530 THERAPEUTIC ACTIVITIES: CPT | Mod: GP | Performed by: PHYSICAL THERAPIST

## 2022-04-19 PROCEDURE — 97112 NEUROMUSCULAR REEDUCATION: CPT | Mod: GP | Performed by: PHYSICAL THERAPIST

## 2022-05-24 ENCOUNTER — THERAPY VISIT (OUTPATIENT)
Dept: PHYSICAL THERAPY | Facility: CLINIC | Age: 42
End: 2022-05-24
Payer: COMMERCIAL

## 2022-05-24 DIAGNOSIS — M62.89 PELVIC FLOOR DYSFUNCTION: Primary | ICD-10-CM

## 2022-05-24 PROCEDURE — 97110 THERAPEUTIC EXERCISES: CPT | Mod: GP | Performed by: PHYSICAL THERAPIST

## 2022-05-24 PROCEDURE — 97112 NEUROMUSCULAR REEDUCATION: CPT | Mod: GP | Performed by: PHYSICAL THERAPIST

## 2022-05-24 PROCEDURE — 97530 THERAPEUTIC ACTIVITIES: CPT | Mod: GP | Performed by: PHYSICAL THERAPIST

## 2022-07-19 ENCOUNTER — THERAPY VISIT (OUTPATIENT)
Dept: PHYSICAL THERAPY | Facility: CLINIC | Age: 42
End: 2022-07-19
Payer: COMMERCIAL

## 2022-07-19 DIAGNOSIS — M62.89 PELVIC FLOOR DYSFUNCTION: Primary | ICD-10-CM

## 2022-07-19 PROCEDURE — 97110 THERAPEUTIC EXERCISES: CPT | Mod: GP | Performed by: PHYSICAL THERAPIST

## 2022-07-19 PROCEDURE — 97140 MANUAL THERAPY 1/> REGIONS: CPT | Mod: GP | Performed by: PHYSICAL THERAPIST

## 2022-07-19 PROCEDURE — 97530 THERAPEUTIC ACTIVITIES: CPT | Mod: GP | Performed by: PHYSICAL THERAPIST

## 2022-08-25 ENCOUNTER — THERAPY VISIT (OUTPATIENT)
Dept: PHYSICAL THERAPY | Facility: CLINIC | Age: 42
End: 2022-08-25
Payer: COMMERCIAL

## 2022-08-25 DIAGNOSIS — M62.89 PELVIC FLOOR DYSFUNCTION: Primary | ICD-10-CM

## 2022-08-25 PROCEDURE — 97110 THERAPEUTIC EXERCISES: CPT | Mod: GP | Performed by: PHYSICAL THERAPIST

## 2022-08-25 PROCEDURE — 97112 NEUROMUSCULAR REEDUCATION: CPT | Mod: GP | Performed by: PHYSICAL THERAPIST

## 2022-08-25 PROCEDURE — 97530 THERAPEUTIC ACTIVITIES: CPT | Mod: GP | Performed by: PHYSICAL THERAPIST

## 2022-10-04 ENCOUNTER — THERAPY VISIT (OUTPATIENT)
Dept: PHYSICAL THERAPY | Facility: CLINIC | Age: 42
End: 2022-10-04
Payer: COMMERCIAL

## 2022-10-04 DIAGNOSIS — M62.89 PELVIC FLOOR DYSFUNCTION: Primary | ICD-10-CM

## 2022-10-04 PROCEDURE — 97530 THERAPEUTIC ACTIVITIES: CPT | Mod: GP | Performed by: PHYSICAL THERAPIST

## 2022-10-04 PROCEDURE — 97110 THERAPEUTIC EXERCISES: CPT | Mod: GP | Performed by: PHYSICAL THERAPIST

## 2022-10-04 NOTE — PROGRESS NOTES
DISCHARGE REPORT    Progress reporting period is from 4/5/22 to 10/4/22.       SUBJECTIVE  Subjective changes noted by patient:  .  Subjective: Pt reports that it has been good, figured out how to cough/sneeze to decrease leakage.  She has one episode of leakage with coughing fit and running to get water.  Leakage is about once per week.  Is ready to try exercises on her own at home.  Current pain level is 0/10  .     Previous pain level was  0/10  .   Changes in function:  Yes (See Goal flowsheet attached for changes in current functional level)  Adverse reaction to treatment or activity: None    OBJECTIVE  Changes noted in objective findings:  Yes, Per vaginal assessment, PERF: 2//4/2/4.  Greater ability to control pelvic floor contraction and relaxation.  return to impact screening demonstrated greater ability to run and jump with minimal feeling of urinary leakage.   1 min Wall Sit: NT    1 min Full Plank: difficulty level 5/10     Bridge w/ unilateral SLR x 30 sec each: NT     Side Plank x30 sec each:    Hop in place x 1 min, 150 bpm: 20 sec 9/10 difficulty     SL Hop x 30 sec each,150 bpm: difficulty 5/10 easier than double leg     SL Step Down x10 each, 30 bpm:    Run in Place x1 min, 170 bpm: 6/10 difficulty no heaviness or leakage     SL Balance x 20 sec each:     SL Heel Raise, x25 each, 30 bpm:         ASSESSMENT/PLAN  Updated problem list and treatment plan: Diagnosis 1:  Pelvic floor dysfunction  Pain -  manual therapy, education and home program  Decreased strength - therapeutic exercise and therapeutic activities  Impaired muscle performance - neuro re-education  Decreased function - therapeutic activities  STG/LTGs have been met or progress has been made towards goals:  Yes (See Goal flow sheet completed today.)  Assessment of Progress: The patient's condition is improving.  Self Management Plans:  Patient has been instructed in a home treatment program.  I have re-evaluated this patient and find  that the nature, scope, duration and intensity of the therapy is appropriate for the medical condition of the patient.  Moon continues to require the following intervention to meet STG and LTG's:  Patient to follow back up in 6 months if needed.     Recommendations:  This patient is ready to be discharged from therapy and continue their home treatment program.    Please refer to the daily flowsheet for treatment today, total treatment time and time spent performing 1:1 timed codes.

## 2022-10-11 PROBLEM — M62.89 PELVIC FLOOR DYSFUNCTION: Status: RESOLVED | Noted: 2022-04-07 | Resolved: 2022-10-11

## 2022-10-23 ENCOUNTER — HEALTH MAINTENANCE LETTER (OUTPATIENT)
Age: 42
End: 2022-10-23

## 2022-11-17 ENCOUNTER — IMMUNIZATION (OUTPATIENT)
Dept: NURSING | Facility: CLINIC | Age: 42
End: 2022-11-17
Payer: COMMERCIAL

## 2022-11-17 PROCEDURE — 0124A COVID-19,PF,PFIZER BOOSTER BIVALENT: CPT

## 2022-11-17 PROCEDURE — 91312 COVID-19,PF,PFIZER BOOSTER BIVALENT: CPT

## 2023-04-02 ENCOUNTER — HEALTH MAINTENANCE LETTER (OUTPATIENT)
Age: 43
End: 2023-04-02

## 2023-04-20 ASSESSMENT — ENCOUNTER SYMPTOMS
HEMATOCHEZIA: 0
BREAST MASS: 0
PALPITATIONS: 0
DYSURIA: 0
JOINT SWELLING: 0
ARTHRALGIAS: 0
WEAKNESS: 0
HEARTBURN: 0
COUGH: 0
FEVER: 0
SORE THROAT: 0
FREQUENCY: 0
EYE PAIN: 0
PARESTHESIAS: 0
SHORTNESS OF BREATH: 0
DIARRHEA: 0
HEADACHES: 0
HEMATURIA: 0
CHILLS: 0
NAUSEA: 0
CONSTIPATION: 0
DIZZINESS: 0
NERVOUS/ANXIOUS: 0
MYALGIAS: 0
ABDOMINAL PAIN: 0

## 2023-04-21 ENCOUNTER — OFFICE VISIT (OUTPATIENT)
Dept: FAMILY MEDICINE | Facility: CLINIC | Age: 43
End: 2023-04-21
Payer: COMMERCIAL

## 2023-04-21 VITALS
RESPIRATION RATE: 16 BRPM | TEMPERATURE: 97.5 F | BODY MASS INDEX: 23.08 KG/M2 | OXYGEN SATURATION: 100 % | SYSTOLIC BLOOD PRESSURE: 118 MMHG | DIASTOLIC BLOOD PRESSURE: 70 MMHG | HEART RATE: 75 BPM | WEIGHT: 152.3 LBS | HEIGHT: 68 IN

## 2023-04-21 DIAGNOSIS — Z12.4 CERVICAL CANCER SCREENING: ICD-10-CM

## 2023-04-21 DIAGNOSIS — Z30.433 ENCOUNTER FOR IUD REMOVAL AND REINSERTION: Primary | ICD-10-CM

## 2023-04-21 PROBLEM — R03.0 ELEVATED BLOOD PRESSURE READING WITHOUT DIAGNOSIS OF HYPERTENSION: Status: RESOLVED | Noted: 2018-06-07 | Resolved: 2023-04-21

## 2023-04-21 PROBLEM — Z23 NEED FOR TDAP VACCINATION: Status: RESOLVED | Noted: 2017-10-25 | Resolved: 2023-04-21

## 2023-04-21 PROBLEM — O09.529 AMA (ADVANCED MATERNAL AGE) MULTIGRAVIDA 35+: Status: RESOLVED | Noted: 2017-11-17 | Resolved: 2023-04-21

## 2023-04-21 PROCEDURE — 87624 HPV HI-RISK TYP POOLED RSLT: CPT | Performed by: PHYSICIAN ASSISTANT

## 2023-04-21 PROCEDURE — G0145 SCR C/V CYTO,THINLAYER,RESCR: HCPCS | Performed by: PHYSICIAN ASSISTANT

## 2023-04-21 PROCEDURE — 58301 REMOVE INTRAUTERINE DEVICE: CPT | Performed by: PHYSICIAN ASSISTANT

## 2023-04-21 PROCEDURE — 58300 INSERT INTRAUTERINE DEVICE: CPT | Performed by: PHYSICIAN ASSISTANT

## 2023-04-21 ASSESSMENT — ENCOUNTER SYMPTOMS
PALPITATIONS: 0
HEADACHES: 0
FEVER: 0
ABDOMINAL PAIN: 0
HEMATOCHEZIA: 0
FREQUENCY: 0
ARTHRALGIAS: 0
NERVOUS/ANXIOUS: 0
SORE THROAT: 0
PARESTHESIAS: 0
HEMATURIA: 0
JOINT SWELLING: 0
DIARRHEA: 0
NAUSEA: 0
EYE PAIN: 0
DIZZINESS: 0
MYALGIAS: 0
BREAST MASS: 0
CONSTIPATION: 0
HEARTBURN: 0
COUGH: 0
CHILLS: 0
DYSURIA: 0
SHORTNESS OF BREATH: 0
WEAKNESS: 0

## 2023-04-21 NOTE — PROGRESS NOTES
"  {PROVIDER CHARTING PREFERENCE:326886}    Rustam Mustafa is a 42 year old, presenting for the following health issues:  IUD        4/21/2023     2:46 PM   Additional Questions   Roomed by FRANCISCA PALACIOS     HPI     {SUPERLIST (Optional):613562}  {additonal problems for provider to add (Optional):526455}      Review of Systems   Constitutional: Negative for chills and fever.   HENT: Negative for congestion, ear pain, hearing loss and sore throat.    Eyes: Negative for pain and visual disturbance.   Respiratory: Negative for cough and shortness of breath.    Cardiovascular: Negative for chest pain, palpitations and peripheral edema.   Gastrointestinal: Negative for abdominal pain, constipation, diarrhea, heartburn, hematochezia and nausea.   Breasts:  Negative for tenderness, breast mass and discharge.   Genitourinary: Negative for dysuria, frequency, genital sores, hematuria, pelvic pain, urgency, vaginal bleeding and vaginal discharge.   Musculoskeletal: Negative for arthralgias, joint swelling and myalgias.   Skin: Negative for rash.   Neurological: Negative for dizziness, weakness, headaches and paresthesias.   Psychiatric/Behavioral: Negative for mood changes. The patient is not nervous/anxious.       {ROS COMP (Optional):659334}      Objective    /70 (BP Location: Left arm, Patient Position: Sitting, Cuff Size: Adult Regular)   Pulse 75   Temp 97.5  F (36.4  C) (Temporal)   Resp 16   Ht 1.725 m (5' 7.91\")   Wt 69.1 kg (152 lb 4.8 oz)   SpO2 100%   BMI 23.22 kg/m    Body mass index is 23.22 kg/m .  Physical Exam   {Exam List (Optional):864740}    {Diagnostic Test Results (Optional):144249}    {AMBULATORY ATTESTATION (Optional):881698}            "

## 2023-04-21 NOTE — PATIENT INSTRUCTIONS
After IUD Insertion  Continue to abstain from intercourse or use condoms as backup birth control for the next 7 days.            It is OK to use either pads or tampons for any spotting or bleeding            You may have some abnormally yellow or brown discharge, which is normal and from the betadine used during the procedure    Your monthly period may become irregular for the first 3-6 months.  You may also experience frequent spotting or light bleeding; a few women have heavy bleeding during this time.  After your body adjusts to the IUD, the number of bleeding days is likely to decrease and you may even find that your periods stop altogether.    You may take ibuprofen or acetaminophen (Tylenol) for any minor cramping that may occur.  Call the clinic with any abdominal pain, fever or other concerns.    To check for the presence of the IUD strings, insert a finger into your vagina and reach toward the back with your fingers. The strings feel similar to fishing line. I recommend having your IUD strings checked with an exam yearly.    Warning Signs:  Within the first 3 weeks of Insertion:            Fever (greater than 101)            Chills            Strong or sharp pain in your stomach or belly    At any time:            Feeling pregnant (breast tenderness, nausea, vomiting)            Positive home pregnancy test    If you develop any of the above warning signs, see your healthcare provider.

## 2023-04-21 NOTE — PROGRESS NOTES
SUBJECTIVE:    Is a pregnancy test required: No.  Was a consent obtained?  Yes    Subjective: Moon Ordoñez is a 42 year old  presents for IUD and desires Mirena type IUD.  She requests removal of the IUD because the IUD effectiveness has     Patient has been given the opportunity to ask questions about all forms of birth control, including all options appropriate for Moon Ordoñez. Discussed that no method of birth control, except abstinence is 100% effective against pregnancy or sexually transmitted infection.     Moon Ordoñez understands she may have the IUD removed at any time. IUD should be removed by a health care provider and the current IUD will be removed today.    The entire removal and insertion procedure was reviewed with the patient, including care after placement.    Today's PHQ-2 Score:     2023     3:25 PM   PHQ-2 (  [employer])   Q1: Little interest or pleasure in doing things 0   Q2: Feeling down, depressed or hopeless 0   PHQ-2 Score 0   Q1: Little interest or pleasure in doing things Not at all    Not at all   Q2: Feeling down, depressed or hopeless Not at all    Not at all   PHQ-2 Score 0    0       PROCEDURE:    Premedicated with ibuprofen.  A speculum exam was performed and the cervix was visualized. The IUD string was visualized. Using ring forceps, the string  was grasped and the IUD removed intact.    Under sterile technique, cervix was visualized with speculum and prepped with Betadine solution swab x 3. Tenaculum was placed for stability. The uterus was gently straightened and sounded to 7.0 cm. IUD prepared for placement, and IUD inserted according to 's instructions without difficulty or significant ressitance, and deployed at the fundus. The strings were visualized and trimmed to 1.5 cm from the external os. Tenaculum was removed and hemostasis noted. Speculum removed.  Patient tolerated procedure well.    Lot #  [id number]  Exp: 10/2024    EBL: minimal    Complications: none      POST PROCEDURE:    Given 's handouts, including when to have IUD removed, list of danger s/sx, side effects and follow up recommended. Encouraged condom use for prevention of STD. Advised to call for any fever, for prolonged or severe pain or bleeding, abnormal vaginal dischage, or unable to palpate strings. She was advised to use pain medications (ibuprofen) as needed for mild to moderate pain. Advised to follow-up in clinic in 4-6 weeks for IUD string check if unable to find strings or as directed by provider.     Guadalupe Ray PA-C

## 2023-04-25 LAB
BKR LAB AP GYN ADEQUACY: NORMAL
BKR LAB AP GYN INTERPRETATION: NORMAL
BKR LAB AP HPV REFLEX: NORMAL
BKR LAB AP PREVIOUS ABNORMAL: NORMAL
PATH REPORT.COMMENTS IMP SPEC: NORMAL
PATH REPORT.COMMENTS IMP SPEC: NORMAL
PATH REPORT.RELEVANT HX SPEC: NORMAL

## 2023-04-26 LAB
HUMAN PAPILLOMA VIRUS 16 DNA: NEGATIVE
HUMAN PAPILLOMA VIRUS 18 DNA: NEGATIVE
HUMAN PAPILLOMA VIRUS FINAL DIAGNOSIS: NORMAL
HUMAN PAPILLOMA VIRUS OTHER HR: NEGATIVE

## 2023-05-25 ASSESSMENT — ENCOUNTER SYMPTOMS
CONSTIPATION: 0
PARESTHESIAS: 0
SORE THROAT: 0
CHILLS: 0
ABDOMINAL PAIN: 0
NAUSEA: 0
DYSURIA: 0
HEMATURIA: 0
JOINT SWELLING: 0
NERVOUS/ANXIOUS: 0
PALPITATIONS: 0
EYE PAIN: 0
HEADACHES: 0
DIARRHEA: 0
HEMATOCHEZIA: 0
HEARTBURN: 0
SHORTNESS OF BREATH: 0
FEVER: 0
WEAKNESS: 0
DIZZINESS: 0
FREQUENCY: 0
MYALGIAS: 0
BREAST MASS: 0
ARTHRALGIAS: 0
COUGH: 0

## 2023-05-26 ENCOUNTER — OFFICE VISIT (OUTPATIENT)
Dept: FAMILY MEDICINE | Facility: CLINIC | Age: 43
End: 2023-05-26
Payer: COMMERCIAL

## 2023-05-26 VITALS
HEART RATE: 80 BPM | SYSTOLIC BLOOD PRESSURE: 122 MMHG | DIASTOLIC BLOOD PRESSURE: 68 MMHG | HEIGHT: 68 IN | WEIGHT: 151 LBS | TEMPERATURE: 97.5 F | OXYGEN SATURATION: 99 % | RESPIRATION RATE: 16 BRPM | BODY MASS INDEX: 22.88 KG/M2

## 2023-05-26 DIAGNOSIS — Z00.00 ROUTINE ADULT HEALTH MAINTENANCE: Primary | ICD-10-CM

## 2023-05-26 DIAGNOSIS — Z13.228 SCREENING FOR ENDOCRINE, METABOLIC AND IMMUNITY DISORDER: ICD-10-CM

## 2023-05-26 DIAGNOSIS — Z13.0 SCREENING FOR ENDOCRINE, METABOLIC AND IMMUNITY DISORDER: ICD-10-CM

## 2023-05-26 DIAGNOSIS — Z13.220 SCREENING CHOLESTEROL LEVEL: ICD-10-CM

## 2023-05-26 DIAGNOSIS — Z13.29 SCREENING FOR ENDOCRINE, METABOLIC AND IMMUNITY DISORDER: ICD-10-CM

## 2023-05-26 DIAGNOSIS — Z13.0 SCREENING FOR DEFICIENCY ANEMIA: ICD-10-CM

## 2023-05-26 LAB
ANION GAP SERPL CALCULATED.3IONS-SCNC: 11 MMOL/L (ref 7–15)
BUN SERPL-MCNC: 12.8 MG/DL (ref 6–20)
CALCIUM SERPL-MCNC: 9 MG/DL (ref 8.6–10)
CHLORIDE SERPL-SCNC: 107 MMOL/L (ref 98–107)
CHOLEST SERPL-MCNC: 187 MG/DL
CREAT SERPL-MCNC: 0.76 MG/DL (ref 0.51–0.95)
DEPRECATED HCO3 PLAS-SCNC: 22 MMOL/L (ref 22–29)
ERYTHROCYTE [DISTWIDTH] IN BLOOD BY AUTOMATED COUNT: 12.7 % (ref 10–15)
GFR SERPL CREATININE-BSD FRML MDRD: >90 ML/MIN/1.73M2
GLUCOSE SERPL-MCNC: 105 MG/DL (ref 70–99)
HCT VFR BLD AUTO: 38.9 % (ref 35–47)
HDLC SERPL-MCNC: 40 MG/DL
HGB BLD-MCNC: 12.7 G/DL (ref 11.7–15.7)
LDLC SERPL CALC-MCNC: 125 MG/DL
MCH RBC QN AUTO: 29.6 PG (ref 26.5–33)
MCHC RBC AUTO-ENTMCNC: 32.6 G/DL (ref 31.5–36.5)
MCV RBC AUTO: 91 FL (ref 78–100)
NONHDLC SERPL-MCNC: 147 MG/DL
PLATELET # BLD AUTO: 309 10E3/UL (ref 150–450)
POTASSIUM SERPL-SCNC: 3.9 MMOL/L (ref 3.4–5.3)
RBC # BLD AUTO: 4.29 10E6/UL (ref 3.8–5.2)
SODIUM SERPL-SCNC: 140 MMOL/L (ref 136–145)
TRIGL SERPL-MCNC: 110 MG/DL
WBC # BLD AUTO: 8.1 10E3/UL (ref 4–11)

## 2023-05-26 PROCEDURE — 80061 LIPID PANEL: CPT | Performed by: PHYSICIAN ASSISTANT

## 2023-05-26 PROCEDURE — 85027 COMPLETE CBC AUTOMATED: CPT | Performed by: PHYSICIAN ASSISTANT

## 2023-05-26 PROCEDURE — 36415 COLL VENOUS BLD VENIPUNCTURE: CPT | Performed by: PHYSICIAN ASSISTANT

## 2023-05-26 PROCEDURE — 80048 BASIC METABOLIC PNL TOTAL CA: CPT | Performed by: PHYSICIAN ASSISTANT

## 2023-05-26 PROCEDURE — 86706 HEP B SURFACE ANTIBODY: CPT | Performed by: PHYSICIAN ASSISTANT

## 2023-05-26 PROCEDURE — 99396 PREV VISIT EST AGE 40-64: CPT | Performed by: PHYSICIAN ASSISTANT

## 2023-05-26 ASSESSMENT — ENCOUNTER SYMPTOMS
HEADACHES: 0
FEVER: 0
EYE PAIN: 0
JOINT SWELLING: 0
PARESTHESIAS: 0
ARTHRALGIAS: 0
PALPITATIONS: 0
FREQUENCY: 0
COUGH: 0
SORE THROAT: 0
ABDOMINAL PAIN: 0
NERVOUS/ANXIOUS: 0
WEAKNESS: 0
NAUSEA: 0
CONSTIPATION: 0
HEMATOCHEZIA: 0
DYSURIA: 0
BREAST MASS: 0
DIZZINESS: 0
HEARTBURN: 0
DIARRHEA: 0
HEMATURIA: 0
SHORTNESS OF BREATH: 0
MYALGIAS: 0
CHILLS: 0

## 2023-05-26 NOTE — PROGRESS NOTES
SUBJECTIVE:   CC: Moon is an 42 year old who presents for preventive health visit.       2023     2:47 PM   Additional Questions   Roomed by shira   Accompanied by self   Patient has been advised of split billing requirements and indicates understanding: Yes  Healthy Habits:    Getting at least 3 servings of Calcium per day:  Yes    Bi-annual eye exam:  NO    Dental care twice a year:  Yes    Sleep apnea or symptoms of sleep apnea:  None    Diet:  Regular (no restrictions)    Frequency of exercise:  2-3 days/week    Duration of exercise:  15-30 minutes    Taking medications regularly:  Yes    Medication side effects:  Not applicable    PHQ-2 Total Score:    Additional concerns today:  No    Here today for R[facility] visit, no particular concerns    Social History     Tobacco Use     Smoking status: Never     Smokeless tobacco: Never   Vaping Use     Vaping status: Never Used   Substance Use Topics     Alcohol use: No           2023     3:50 PM   Alcohol Use   Prescreen: >3 drinks/day or >7 drinks/week? No          View : No data to display.              Reviewed orders with patient.  Reviewed health maintenance and updated orders accordingly - Yes    Breast Cancer Screenin/28/2022     9:07 AM   Breast CA Risk Assessment (FHS-7)   Do you have a family history of breast, colon, or ovarian cancer? No / Unknown       Mammogram Screening - Offered annual screening and updated Health Maintenance for mutual plan based on risk factor consideration    Pertinent mammograms are reviewed under the imaging tab.    History of abnormal Pap smear: NO - age 30-65 PAP every 5 years with negative HPV co-testing recommended      Latest Ref Rng & Units 2023     3:16 PM 2018     1:14 PM 2018    12:56 PM   PAP / HPV   PAP  Negative for Intraepithelial Lesion or Malignancy (NILM)       PAP (Historical)    NIL     HPV 16 DNA Negative Negative   Negative      HPV 18 DNA Negative Negative   Negative      Other HR  "HPV Negative Negative   Negative        Reviewed and updated as needed this visit by clinical staff   Tobacco  Allergies  Meds   Med Hx  Surg Hx  Fam Hx          Reviewed and updated as needed this visit by Provider   Tobacco     Med Hx  Surg Hx  Fam Hx           Review of Systems   Constitutional: Negative for chills and fever.   HENT: Negative for congestion, ear pain, hearing loss and sore throat.    Eyes: Negative for pain and visual disturbance.   Respiratory: Negative for cough and shortness of breath.    Cardiovascular: Negative for chest pain, palpitations and peripheral edema.   Gastrointestinal: Negative for abdominal pain, constipation, diarrhea, heartburn, hematochezia and nausea.   Breasts:  Negative for tenderness, breast mass and discharge.   Genitourinary: Negative for dysuria, frequency, genital sores, hematuria, pelvic pain, urgency, vaginal bleeding and vaginal discharge.   Musculoskeletal: Negative for arthralgias, joint swelling and myalgias.   Skin: Negative for rash.   Neurological: Negative for dizziness, weakness, headaches and paresthesias.   Psychiatric/Behavioral: Negative for mood changes. The patient is not nervous/anxious.         OBJECTIVE:   /68 (BP Location: Right arm, Patient Position: Sitting, Cuff Size: Adult Regular)   Pulse 80   Temp 97.5  F (36.4  C) (Temporal)   Resp 16   Ht 1.722 m (5' 7.8\")   Wt 68.5 kg (151 lb)   SpO2 99%   BMI 23.10 kg/m    Physical Exam  GENERAL: healthy, alert and no distress  EYES: Eyes grossly normal to inspection, PERRL and conjunctivae and sclerae normal  HENT: ear canals and TM's normal, nose and mouth without ulcers or lesions  NECK: no adenopathy, no asymmetry, masses, or scars and thyroid normal to palpation  RESP: lungs clear to auscultation - no rales, rhonchi or wheezes  BREAST: normal without masses, tenderness or nipple discharge and no palpable axillary masses or adenopathy  CV: regular rate and rhythm, normal S1 S2, " no S3 or S4, no murmur, click or rub, no peripheral edema and peripheral pulses strong  ABDOMEN: soft, nontender, no hepatosplenomegaly, no masses and bowel sounds normal  MS: no gross musculoskeletal defects noted, no edema  SKIN: no suspicious lesions or rashes  NEURO: Normal strength and tone, mentation intact and speech normal  PSYCH: mentation appears normal, affect normal/bright      ASSESSMENT/PLAN:   Moon was seen today for physical.    Diagnoses and all orders for this visit:    Routine adult health maintenance  -     HEPATITIS B VACCINE, ADULT, IM    Screening for deficiency anemia  -     CBC with platelets; Future  -     CBC with platelets    Screening for endocrine, metabolic and immunity disorder  -     Basic metabolic panel; Future  -     Basic metabolic panel    Screening cholesterol level  -     Lipid panel reflex to direct LDL Non-fasting; Future  -     Lipid panel reflex to direct LDL Non-fasting      COUNSELING:  Reviewed preventive health counseling, as reflected in patient instructions        She reports that she has never smoked. She has never used smokeless tobacco.      Guadalupe Ray PA-C  [facility]

## 2023-05-27 LAB
HBV SURFACE AB SERPL IA-ACNC: 1.57 M[IU]/ML
HBV SURFACE AB SERPL IA-ACNC: NONREACTIVE M[IU]/ML

## 2023-10-14 ENCOUNTER — IMMUNIZATION (OUTPATIENT)
Dept: PEDIATRICS | Facility: CLINIC | Age: 43
End: 2023-10-14
Payer: COMMERCIAL

## 2023-10-14 PROCEDURE — 90686 IIV4 VACC NO PRSV 0.5 ML IM: CPT

## 2023-10-14 PROCEDURE — 90471 IMMUNIZATION ADMIN: CPT

## 2023-11-08 ENCOUNTER — IMMUNIZATION (OUTPATIENT)
Dept: PEDIATRICS | Facility: CLINIC | Age: 43
End: 2023-11-08
Payer: COMMERCIAL

## 2023-11-08 DIAGNOSIS — Z23 ENCOUNTER FOR IMMUNIZATION: Primary | ICD-10-CM

## 2023-11-08 PROCEDURE — 99207 PR NO CHARGE NURSE ONLY: CPT

## 2023-11-08 PROCEDURE — 91320 SARSCV2 VAC 30MCG TRS-SUC IM: CPT

## 2023-11-08 PROCEDURE — 90480 ADMN SARSCOV2 VAC 1/ONLY CMP: CPT

## 2024-03-30 ENCOUNTER — HEALTH MAINTENANCE LETTER (OUTPATIENT)
Age: 44
End: 2024-03-30

## 2024-04-10 ENCOUNTER — HOSPITAL ENCOUNTER (OUTPATIENT)
Dept: MAMMOGRAPHY | Facility: CLINIC | Age: 44
Discharge: HOME | End: 2024-04-10
Admitting: PHYSICIAN ASSISTANT
Payer: COMMERCIAL

## 2024-04-10 DIAGNOSIS — Z12.31 VISIT FOR SCREENING MAMMOGRAM: ICD-10-CM

## 2024-04-10 PROCEDURE — 77063 BREAST TOMOSYNTHESIS BI: CPT

## 2024-05-28 ENCOUNTER — OFFICE VISIT (OUTPATIENT)
Dept: FAMILY MEDICINE | Facility: CLINIC | Age: 44
End: 2024-05-28
Payer: COMMERCIAL

## 2024-05-28 VITALS
HEIGHT: 68 IN | DIASTOLIC BLOOD PRESSURE: 70 MMHG | BODY MASS INDEX: 23.67 KG/M2 | TEMPERATURE: 97.2 F | WEIGHT: 156.2 LBS | SYSTOLIC BLOOD PRESSURE: 106 MMHG | OXYGEN SATURATION: 100 % | HEART RATE: 68 BPM | RESPIRATION RATE: 15 BRPM

## 2024-05-28 DIAGNOSIS — Z00.00 ROUTINE GENERAL MEDICAL EXAMINATION AT A HEALTH CARE FACILITY: Primary | ICD-10-CM

## 2024-05-28 DIAGNOSIS — Z23 ENCOUNTER FOR IMMUNIZATION: ICD-10-CM

## 2024-05-28 LAB
ANION GAP SERPL CALCULATED.3IONS-SCNC: 7 MMOL/L (ref 7–15)
BUN SERPL-MCNC: 12.3 MG/DL (ref 6–20)
CALCIUM SERPL-MCNC: 9.1 MG/DL (ref 8.6–10)
CHLORIDE SERPL-SCNC: 107 MMOL/L (ref 98–107)
CHOLEST SERPL-MCNC: 181 MG/DL
CREAT SERPL-MCNC: 0.79 MG/DL (ref 0.51–0.95)
DEPRECATED HCO3 PLAS-SCNC: 25 MMOL/L (ref 22–29)
EGFRCR SERPLBLD CKD-EPI 2021: >90 ML/MIN/1.73M2
ERYTHROCYTE [DISTWIDTH] IN BLOOD BY AUTOMATED COUNT: 12.4 % (ref 10–15)
FASTING STATUS PATIENT QL REPORTED: NO
FASTING STATUS PATIENT QL REPORTED: NO
GLUCOSE SERPL-MCNC: 86 MG/DL (ref 70–99)
HCT VFR BLD AUTO: 39.6 % (ref 35–47)
HDLC SERPL-MCNC: 47 MG/DL
HGB BLD-MCNC: 12.9 G/DL (ref 11.7–15.7)
LDLC SERPL CALC-MCNC: 119 MG/DL
MCH RBC QN AUTO: 29.6 PG (ref 26.5–33)
MCHC RBC AUTO-ENTMCNC: 32.6 G/DL (ref 31.5–36.5)
MCV RBC AUTO: 91 FL (ref 78–100)
NONHDLC SERPL-MCNC: 134 MG/DL
PLATELET # BLD AUTO: 324 10E3/UL (ref 150–450)
POTASSIUM SERPL-SCNC: 4.1 MMOL/L (ref 3.4–5.3)
RBC # BLD AUTO: 4.36 10E6/UL (ref 3.8–5.2)
SODIUM SERPL-SCNC: 139 MMOL/L (ref 135–145)
TRIGL SERPL-MCNC: 76 MG/DL
TSH SERPL DL<=0.005 MIU/L-ACNC: 3.29 UIU/ML (ref 0.3–4.2)
WBC # BLD AUTO: 7.3 10E3/UL (ref 4–11)

## 2024-05-28 PROCEDURE — 99396 PREV VISIT EST AGE 40-64: CPT | Mod: 25 | Performed by: PHYSICIAN ASSISTANT

## 2024-05-28 PROCEDURE — 90471 IMMUNIZATION ADMIN: CPT | Performed by: PHYSICIAN ASSISTANT

## 2024-05-28 PROCEDURE — 80048 BASIC METABOLIC PNL TOTAL CA: CPT | Performed by: PHYSICIAN ASSISTANT

## 2024-05-28 PROCEDURE — 85027 COMPLETE CBC AUTOMATED: CPT | Performed by: PHYSICIAN ASSISTANT

## 2024-05-28 PROCEDURE — 36415 COLL VENOUS BLD VENIPUNCTURE: CPT | Performed by: PHYSICIAN ASSISTANT

## 2024-05-28 PROCEDURE — 90746 HEPB VACCINE 3 DOSE ADULT IM: CPT | Performed by: PHYSICIAN ASSISTANT

## 2024-05-28 PROCEDURE — 84443 ASSAY THYROID STIM HORMONE: CPT | Performed by: PHYSICIAN ASSISTANT

## 2024-05-28 PROCEDURE — 80061 LIPID PANEL: CPT | Performed by: PHYSICIAN ASSISTANT

## 2024-05-28 SDOH — HEALTH STABILITY: PHYSICAL HEALTH: ON AVERAGE, HOW MANY DAYS PER WEEK DO YOU ENGAGE IN MODERATE TO STRENUOUS EXERCISE (LIKE A BRISK WALK)?: 2 DAYS

## 2024-05-28 SDOH — HEALTH STABILITY: PHYSICAL HEALTH: ON AVERAGE, HOW MANY MINUTES DO YOU ENGAGE IN EXERCISE AT THIS LEVEL?: 10 MIN

## 2024-05-28 ASSESSMENT — SOCIAL DETERMINANTS OF HEALTH (SDOH): HOW OFTEN DO YOU GET TOGETHER WITH FRIENDS OR RELATIVES?: MORE THAN THREE TIMES A WEEK

## 2024-05-28 ASSESSMENT — PAIN SCALES - GENERAL: PAINLEVEL: NO PAIN (0)

## 2024-05-28 NOTE — PATIENT INSTRUCTIONS
"Preventive Care Advice   This is general advice we often give to help people stay healthy. Your care team may have specific advice just for you. Please talk to your care team about your own preventive care needs.  Lifestyle  Exercise at least 150 minutes each week (30 minutes a day, 5 days a week).  Do muscle strengthening activities 2 days a week. These help control your weight and prevent disease.  No smoking.  Wear sunscreen to prevent skin cancer.  Have your home tested for radon every 2 to 5 years. Radon is a colorless, odorless gas that can harm your lungs. To learn more, go to www.health.[facility].mn. and search for \"Radon in Homes.\"  Keep guns unloaded and locked up in a safe place like a safe or gun vault, or, use a gun lock and hide the keys. Always lock away bullets separately. To learn more, visit [place].gov and search for \"safe gun storage.\"  Nutrition  Eat 5 or more servings of fruits and vegetables each day.  Try wheat bread, brown rice and whole grain pasta (instead of white bread, rice, and pasta).  Get enough calcium and vitamin D. Check the label on foods and aim for 100% of the RDA (recommended daily allowance).  Regular exams  Have a dental exam and cleaning every 6 months.  See your health care team every year to talk about:  Any changes in your health.  Any medicines your care team has prescribed.  Preventive care, family planning, and ways to prevent chronic diseases.  Shots (vaccines)   HPV shots (up to age 26), if you've never had them before.  Hepatitis B shots (up to age 59), if you've never had them before.  COVID-19 shot: Get this shot when it's due.  Flu shot: Get a flu shot every year.  Tetanus shot: Get a tetanus shot every 10 years.  Pneumococcal, hepatitis A, and RSV shots: Ask your care team if you need these based on your risk.  Shingles shot (for age 50 and up).  General health tests  Diabetes screening:  Starting at age 35, Get screened for diabetes at least every 3 years.  If " you are younger than age 35, ask your care team if you should be screened for diabetes.  Cholesterol test: At age 39, start having a cholesterol test every 5 years, or more often if advised.  Bone density scan (DEXA): At age 50, ask your care team if you should have this scan for osteoporosis (brittle bones).  Hepatitis C: Get tested at least once in your life.  Abdominal aortic aneurysm screening: Talk to your doctor about having this screening if you:  Have ever smoked; and  Are biologically male; and  Are between the ages of 65 and 75.  STIs (sexually transmitted infections)  Before age 24: Ask your care team if you should be screened for STIs.  After age 24: Get screened for STIs if you're at risk. You are at risk for STIs (including HIV) if:  You are sexually active with more than one person.  You don't use condoms every time.  You or a partner was diagnosed with a sexually transmitted infection.  If you are at risk for HIV, ask about PrEP medicine to prevent HIV.  Get tested for HIV at least once in your life, whether you are at risk for HIV or not.  Cancer screening tests  Cervical cancer screening: If you have a cervix, begin getting regular cervical cancer screening tests at age 21. Most people who have regular screenings with normal results can stop after age 65. Talk about this with your provider.  Breast cancer scan (mammogram): If you've ever had breasts, begin having regular mammograms starting at age 40. This is a scan to check for breast cancer.  Colon cancer screening: It is important to start screening for colon cancer at age 45.  Have a colonoscopy test every 10 years (or more often if you're at risk) Or, ask your provider about stool tests like a FIT test every year or Cologuard test every 3 years.  To learn more about your testing options, visit: www.[employer].com/342033.pdf.  For help making a decision, visit: felix/te85321.  Prostate cancer screening test: If you have a prostate and are age 55  to 69, ask your provider if you would benefit from a yearly prostate cancer screening test.  Lung cancer screening: If you are a current or former smoker age 50 to 80, ask your care team if ongoing lung cancer screenings are right for you.  For informational purposes only. Not to replace the advice of your health care provider. Copyright   2023 [employer]. All rights reserved. Clinically reviewed by the [facility] Transitions Program. [employer] 757221 - REV 04/24.

## 2024-05-28 NOTE — NURSING NOTE
Prior to immunization administration, verified patients identity using patient s name and date of birth. Please see Immunization Activity for additional information.     Screening Questionnaire for Adult Immunization    Are you sick today?   No   Do you have allergies to medications, food, a vaccine component or latex?   No   Have you ever had a serious reaction after receiving a vaccination?   No   Do you have a long-term health problem with heart, lung, kidney, or metabolic disease (e.g., diabetes), asthma, a blood disorder, no spleen, complement component deficiency, a cochlear implant, or a spinal fluid leak?  Are you on long-term aspirin therapy?   No   Do you have cancer, leukemia, HIV/AIDS, or any other immune system problem?   No   Do you have a parent, brother, or sister with an immune system problem?   No   In the past 3 months, have you taken medications that affect  your immune system, such as prednisone, other steroids, or anticancer drugs; drugs for the treatment of rheumatoid arthritis, Crohn s disease, or psoriasis; or have you had radiation treatments?   No   Have you had a seizure, or a brain or other nervous system problem?   No   During the past year, have you received a transfusion of blood or blood    products, or been given immune (gamma) globulin or antiviral drug?   No   For women: Are you pregnant or is there a chance you could become       pregnant during the next month?   No   Have you received any vaccinations in the past 4 weeks?   No     Immunization questionnaire answers were all negative.      Patient instructed to remain in clinic for 15 minutes afterwards, and to report any adverse reactions.     Screening performed by Lakshmi Leal MA on 5/28/2024 at 10:55 AM.

## 2024-05-28 NOTE — PROGRESS NOTES
Preventive Care Visit  [facility]  Guadalupe Ray PA-C, Physician Assistant - Medical  May 28, 2024      Assessment & Plan     Routine general medical examination at a health care facility  - REVIEW OF HEALTH MAINTENANCE PROTOCOL ORDERS  - CBC with platelets  - Basic metabolic panel  - Lipid panel reflex to direct LDL Non-fasting  - TSH with free T4 reflex  - Adult Dermatology  Referral  - CBC with platelets  - Basic metabolic panel  - Lipid panel reflex to direct LDL Non-fasting  - TSH with free T4 reflex    Encounter for immunization  - HEPATITIS B, ADULT 20+ (ENGERIX-B/RECOMBIVAX HB)      Counseling  Appropriate preventive services were discussed with this patient, including applicable screening as appropriate for fall prevention, nutrition, physical activity, Tobacco-use cessation, weight loss and cognition.  Checklist reviewing preventive services available has been given to the patient.  Reviewed patient's diet, addressing concerns and/or questions.   She is at risk for lack of exercise and has been provided with information to increase physical activity for the benefit of her well-being.         Rustam Mustafa is a 43 year old, presenting for the following:  Physical        5/28/2024    10:32 AM   Additional Questions   Roomed by Lakshmi Mustafa here today for [facility] visit  No particular concerns  Mirena going well - small spotting every 3 months or so    Health Care Directive  Patient does not have a Health Care Directive or Living Will: Discussed advance care planning with patient; however, patient declined at this time.    HPI      5/28/2024   General Health   How would you rate your overall physical health? Good   Feel stress (tense, anxious, or unable to sleep) Not at all         5/28/2024   Nutrition   Three or more servings of calcium each day? Yes   Diet: Regular (no restrictions)   How many servings of fruit and vegetables per day? (!) 2-3   How many  sweetened beverages each day? 0-1         5/28/2024   Exercise   Days per week of moderate/strenous exercise 2 days   Average minutes spent exercising at this level 10 min   (!) EXERCISE CONCERN      5/28/2024   Social Factors   Frequency of gathering with friends or relatives More than three times a week   Worry food won't last until get money to buy more No   Food not last or not have enough money for food? No   Do you have housing?  Yes   Are you worried about losing your housing? No   Lack of transportation? No   Unable to get utilities (heat,electricity)? No         5/28/2024   Dental   Dentist two times every year? Yes         5/28/2024   TB Screening   Were you born outside of the [place]? No         Today's PHQ-2 Score:       5/28/2024     9:49 AM   PHQ-2 ( 1999 Pfizer)   Q1: Little interest or pleasure in doing things 0   Q2: Feeling down, depressed or hopeless 0   PHQ-2 Score 0   Q1: Little interest or pleasure in doing things Not at all   Q2: Feeling down, depressed or hopeless Not at all   PHQ-2 Score 0           5/28/2024   Substance Use   Alcohol more than 3/day or more than 7/wk No   Do you use any other substances recreationally? No     Social History     Tobacco Use    Smoking status: Never    Smokeless tobacco: Never   Vaping Use    Vaping status: Never Used   Substance Use Topics    Alcohol use: No    Drug use: No             5/28/2024   Breast Cancer Screening   Family history of breast, colon, or ovarian cancer? No / Unknown         4/10/2024   LAST FHS-7 RESULTS   1st degree relative breast or ovarian cancer No   Any relative bilateral breast cancer No   Any male have breast cancer No   Any ONE woman have BOTH breast AND ovarian cancer No   Any woman with breast cancer before 50yrs No   2 or more relatives with breast AND/OR ovarian cancer No   2 or more relatives with breast AND/OR bowel cancer No        Mammogram Screening - Mammogram every 1-2 years updated in Health Maintenance based on mutual  "decision making        5/28/2024   STI Screening   New sexual partner(s) since last STI/HIV test? No     History of abnormal Pap smear: No - age 30-64 HPV with reflex Pap every 5 years recommended        Latest Ref Rng & Units 4/21/2023     3:16 PM 8/8/2018     1:14 PM 8/8/2018    12:56 PM   PAP / HPV   PAP  Negative for Intraepithelial Lesion or Malignancy (NILM)      PAP (Historical)    NIL    HPV 16 DNA Negative Negative  Negative     HPV 18 DNA Negative Negative  Negative     Other HR HPV Negative Negative  Negative       ASCVD Risk   The 10-year ASCVD risk score (Lux MANZANO, et al., 2019) is: 0.7%    Values used to calculate the score:      Age: 43 years      Sex: Female      Is Non- : No      Diabetic: No      Tobacco smoker: No      Systolic Blood Pressure: 106 mmHg      Is BP treated: No      HDL Cholesterol: 40 mg/dL      Total Cholesterol: 187 mg/dL        5/28/2024   Contraception/Family Planning   Questions about contraception or family planning No        Reviewed and updated as needed this visit by Provider     Meds  Problems  Med Hx  Surg Hx  Fam Hx               Objective    Exam  /70 (BP Location: Right arm, Patient Position: Sitting, Cuff Size: Adult Regular)   Pulse 68   Temp 97.2  F (36.2  C) (Temporal)   Resp 15   Ht 1.734 m (5' 8.25\")   Wt 70.9 kg (156 lb 3.2 oz)   SpO2 100%   Breastfeeding No   BMI 23.58 kg/m     Estimated body mass index is 23.58 kg/m  as calculated from the following:    Height as of this encounter: 1.734 m (5' 8.25\").    Weight as of this encounter: 70.9 kg (156 lb 3.2 oz).    Physical Exam  GENERAL: alert and no distress  EYES: Eyes grossly normal to inspection, PERRL and conjunctivae and sclerae normal  HENT: ear canals and TM's normal, nose and mouth without ulcers or lesions  NECK: no adenopathy, no asymmetry, masses, or scars  RESP: lungs clear to auscultation - no rales, rhonchi or wheezes  CV: regular rate and rhythm, " normal S1 S2, no S3 or S4, no murmur, click or rub, no peripheral edema  ABDOMEN: soft, nontender, no hepatosplenomegaly, no masses and bowel sounds normal  MS: no gross musculoskeletal defects noted, no edema  SKIN: no suspicious lesions or rashes  NEURO: Normal strength and tone, mentation intact and speech normal  PSYCH: mentation appears normal, affect normal/bright        Signed Electronically by: Guadalupe Ray PA-C

## 2024-09-26 ENCOUNTER — IMMUNIZATION (OUTPATIENT)
Dept: FAMILY MEDICINE | Facility: CLINIC | Age: 44
End: 2024-09-26
Payer: COMMERCIAL

## 2024-09-26 PROCEDURE — 90480 ADMN SARSCOV2 VAC 1/ONLY CMP: CPT

## 2024-09-26 PROCEDURE — 91320 SARSCV2 VAC 30MCG TRS-SUC IM: CPT

## 2024-10-15 ENCOUNTER — IMMUNIZATION (OUTPATIENT)
Dept: FAMILY MEDICINE | Facility: CLINIC | Age: 44
End: 2024-10-15
Payer: COMMERCIAL

## 2024-10-15 PROCEDURE — 90656 IIV3 VACC NO PRSV 0.5 ML IM: CPT

## 2024-10-15 PROCEDURE — 90471 IMMUNIZATION ADMIN: CPT

## 2024-10-21 ENCOUNTER — VIRTUAL VISIT (OUTPATIENT)
Dept: FAMILY MEDICINE | Facility: CLINIC | Age: 44
End: 2024-10-21
Payer: COMMERCIAL

## 2024-10-21 DIAGNOSIS — J20.9 ACUTE BRONCHITIS, UNSPECIFIED ORGANISM: Primary | ICD-10-CM

## 2024-10-21 PROCEDURE — 99213 OFFICE O/P EST LOW 20 MIN: CPT | Mod: 95 | Performed by: NURSE PRACTITIONER

## 2024-10-21 RX ORDER — PREDNISONE 20 MG/1
20 TABLET ORAL DAILY
Qty: 10 TABLET | Refills: 0 | Status: SHIPPED | OUTPATIENT
Start: 2024-10-21

## 2024-10-21 RX ORDER — ALBUTEROL SULFATE 90 UG/1
2 INHALANT RESPIRATORY (INHALATION) EVERY 6 HOURS PRN
Qty: 18 G | Refills: 0 | Status: SHIPPED | OUTPATIENT
Start: 2024-10-21

## 2024-10-21 ASSESSMENT — ENCOUNTER SYMPTOMS: COUGH: 1

## 2024-10-21 NOTE — PROGRESS NOTES
"Moon is a 43 year old who is being evaluated via a billable video visit.    How would you like to obtain your AVS? MyChart  If the video visit is dropped, the invitation should be resent by: Text to cell phone: [phone]  Will anyone else be joining your video visit? No      Assessment & Plan     Acute bronchitis, unspecified organism  Symptoms sound consistent with bronchitis likely secondary to recent URI and environmental irritants.  Will put on a course of oral steroids and inhaler PRN.  Discussed use of Mucinex and Flonase for congestion, also consider dextromethorphan for coughing.  Avoid environmental triggers such as dust and smoke.  Follow up if symptoms worsen or persist.  Could also consider short course of PPI until symptoms resolved.    - albuterol (PROAIR HFA/PROVENTIL HFA/VENTOLIN HFA) 108 (90 Base) MCG/ACT inhaler; Inhale 2 puffs into the lungs every 6 hours as needed for shortness of breath, wheezing or cough.  - predniSONE (DELTASONE) 20 MG tablet; Take 1 tablet (20 mg) by mouth daily.        Subjective   Moon is a 43 year old, presenting for the following health issues:  Cough (Persistent cough after upper respiratory infection (~2.5 weeks).)        10/21/2024     3:24 PM   Additional Questions   Roomed by Esthela LO     Cough    History of Present Illness       Reason for visit:  Persistent cough after upper respiratory infection.  Symptom onset:  3-4 weeks ago  Symptoms include:  Cough with phlegm  Symptom intensity:  Moderate  Symptom progression:  Staying the same  Had these symptoms before:  Yes  Has tried/received treatment for these symptoms:  No  What makes it better:  Cough suppressant   She is taking medications regularly.                 Objective    Vitals - Patient Reported  Weight (Patient Reported): 68 kg (150 lb)  Height (Patient Reported): 172.7 cm (5' 8\")  BMI (Based on Pt Reported Ht/Wt): 22.81  Pain Score: No Pain (0)        Physical Exam   GENERAL: alert and no " distress  EYES: Eyes grossly normal to inspection.  No discharge or erythema, or obvious scleral/conjunctival abnormalities.  RESP: No audible wheeze, cough, or visible cyanosis.    SKIN: Visible skin clear. No significant rash, abnormal pigmentation or lesions.  NEURO: Cranial nerves grossly intact.  Mentation and speech appropriate for age.  PSYCH: Appropriate affect, tone, and pace of words          Video-Visit Details    Type of service:  Video Visit   Originating Location (pt. Location): Home    Distant Location (provider location):  Off-site  Platform used for Video Visit: Giorgio  Signed Electronically by: Nayeli Traore DNP

## 2025-01-05 NOTE — PROGRESS NOTES
[facility] Dermatology Note  Encounter Date: Jan 6, 2025  Office Visit     Dermatology Problem List:    1. H/o of DN  2. Sebaceous cyst   ____________________________________________    Assessment & Plan:    # Sebaceous cyst, R upper back   - advised of benign nature, reassurance given. No tx needed at this time   -Will monitor clinically. Pt to contact clinic if painful/ inflamed.     # Skin cancer screening with multiple benign nevi, solar lentigines    - ABCDEs: Counseled ABCDEs of melanoma: Asymmetry, Border (irregularity), Color (not uniform, changes in color), Diameter (greater than 6 mm which is about the size of a pencil eraser), and Evolving (any changes in preexisting moles).  - Sun protection: Counseled SPF30+ sunscreen, UPF clothing, sun avoidance, tanning bed avoidance.    # Cherry angiomas and seborrheic keratoses,  Benign, reassurance given.      # H/o severe dysplastic nevus, central upper back   - previously excised at  [facility] 2014 No signs of re-pigmentation  today.     Procedures Performed:   N/A    Follow-up: 1-2 year(s) in-person, or earlier for new or changing lesions    Staff and Scribe:     Scribe Disclosure:   I, Marina Jones, am serving as a scribe; to document services personally performed by Manisha Carias PA-C -based on data collection and the provider's statements to me.     Provider Disclosure:  I agree with above History, Review of Systems, Physical exam and Plan.  I have reviewed the content of the documentation and have edited it as needed. I have personally performed the services documented here and the documentation accurately represents those services and the decisions I have made.      Electronically signed by:  All risk, benefits and alternatives were discussed with patient.  Patient is in agreement and understands the assessment and plan.  All questions were answered.  Sun Screen Education was given.   Return to Clinic in 1-2 years or  sooner as needed.   Manisha Carias PA-C    ____________________________________________    CC: Derm Problem (Moon is here today for a FBSE; Pt reports moles on her back.)    HPI:  Ms. Moon Ordoñez is a(n) 44 year old female who presents today as a new patient for FBSC.    The patient reports that her last skin check was approx. 7 years ago. She previously has had bx done, all dermatopathology came back benign.   She recalls she did not wear strong sunscreen in her youth. She infrequently used tanning beds in [place].   She notes she has many moles on her back that she would like checked.   She has a question about a cyst that is on her back. She notes that it sometimes becomes itchy.     Patient is otherwise feeling well, without additional skin concerns.    Labs Reviewed:  N/A    Physical Exam:  Vitals: There were no vitals taken for this visit.  SKIN: Full skin, which includes the head/face, both arms, chest, back, abdomen,both legs, genitalia and/or groin buttocks, digits and/or nails, was examined.  - Linear scar on central upper back from previous excision of severe DN, no signs of reoccurrence   - R upper back fairly palpable subcutaneous papule nodule consistent with sebaceous cyst  - There are dome shaped bright red papules on the trunk and extremities .   - Multiple regular brown pigmented macules and papules are identified on the trunk and extremities. .   - Scattered brown macules on sun exposed areas.  - Waxy stuck on papules and plaques on trunk and extremities.   - No other lesions of concern on areas examined.     Medications:  Current Outpatient Medications   Medication Sig Dispense Refill    albuterol (PROAIR HFA/PROVENTIL HFA/VENTOLIN HFA) 108 (90 Base) MCG/ACT inhaler Inhale 2 puffs into the lungs every 6 hours as needed for shortness of breath, wheezing or cough. 18 g 0    levonorgestrel (MIRENA) 20 MCG/DAY IUD 1 each (20 mcg) by Intrauterine route once for 1 dose 1 each 0     predniSONE (DELTASONE) 20 MG tablet Take 1 tablet (20 mg) by mouth daily. 10 tablet 0     No current facility-administered medications for this visit.      Past Medical History:   Patient Active Problem List   Diagnosis    Screening for malignant neoplasm of cervix    Female stress incontinence     Past Medical History:   Diagnosis Date    NO ACTIVE PROBLEMS         CC Guadalupe Ray PA-C  [address] [place] [address] on close of this encounter.

## 2025-01-06 ENCOUNTER — OFFICE VISIT (OUTPATIENT)
Dept: DERMATOLOGY | Facility: CLINIC | Age: 45
End: 2025-01-06
Payer: COMMERCIAL

## 2025-01-06 DIAGNOSIS — L82.1 SEBORRHEIC KERATOSES: ICD-10-CM

## 2025-01-06 DIAGNOSIS — D18.01 CHERRY ANGIOMA: ICD-10-CM

## 2025-01-06 DIAGNOSIS — Z86.018 HISTORY OF DYSPLASTIC NEVUS: ICD-10-CM

## 2025-01-06 DIAGNOSIS — Z00.00 ROUTINE GENERAL MEDICAL EXAMINATION AT A HEALTH CARE FACILITY: ICD-10-CM

## 2025-01-06 DIAGNOSIS — L72.9 CYST OF SKIN: ICD-10-CM

## 2025-01-06 DIAGNOSIS — D22.9 MULTIPLE BENIGN NEVI: ICD-10-CM

## 2025-01-06 DIAGNOSIS — Z12.83 SKIN CANCER SCREENING: Primary | ICD-10-CM

## 2025-01-06 DIAGNOSIS — L81.4 SOLAR LENTIGO: ICD-10-CM

## 2025-01-06 PROCEDURE — 99203 OFFICE O/P NEW LOW 30 MIN: CPT | Performed by: PHYSICIAN ASSISTANT

## 2025-01-06 ASSESSMENT — PAIN SCALES - GENERAL: PAINLEVEL_OUTOF10: NO PAIN (0)

## 2025-01-06 NOTE — NURSING NOTE
Dermatology Rooming Note    Moon Ordoñez's goals for this visit include:   Chief Complaint   Patient presents with    Derm Problem     Moon is here today for a FBSE; Pt reports moles on her back.     Deepa Silvestre, Visit Facilitator

## 2025-01-06 NOTE — PATIENT INSTRUCTIONS

## 2025-01-06 NOTE — LETTER
1/6/2025       RE: Moon Ordoñez  [address] [facility] [address]     Dear Colleague,    Thank you for referring your patient, Moon Ordoñez, to the [facility] DERMAT[facility] [place] at [facility]. Please see a copy of my visit note below.    [facility] Dermatology Note  Encounter Date: Jan 6, 2025  Office Visit     Dermatology Problem List:    1. H/o of DN  2. Sebaceous cyst   ____________________________________________    Assessment & Plan:    # Sebaceous cyst, R upper back   - advised of benign nature, reassurance given. No tx needed at this time   -Will monitor clinically. Pt to contact clinic if painful/ inflamed.     # Skin cancer screening with multiple benign nevi, solar lentigines    - ABCDEs: Counseled ABCDEs of melanoma: Asymmetry, Border (irregularity), Color (not uniform, changes in color), Diameter (greater than 6 mm which is about the size of a pencil eraser), and Evolving (any changes in preexisting moles).  - Sun protection: Counseled SPF30+ sunscreen, UPF clothing, sun avoidance, tanning bed avoidance.    # Cherry angiomas and seborrheic keratoses,  Benign, reassurance given.      # H/o severe dysplastic nevus, central upper back   - previously excised at  [facility] No signs of re-pigmentation  today.     Procedures Performed:   N/A    Follow-up: 1-2 year(s) in-person, or earlier for new or changing lesions    Staff and Scribe:     Scribe Disclosure:   I, Marina Jones, am serving as a scribe; to document services personally performed by Manisha Carias PA-C -based on data collection and the provider's statements to me.     Provider Disclosure:  I agree with above History, Review of Systems, Physical exam and Plan.  I have reviewed the content of the documentation and have edited it as needed. I have personally performed the services documented here and the  documentation accurately represents those services and the decisions I have made.      Electronically signed by:  All risk, benefits and alternatives were discussed with patient.  Patient is in agreement and understands the assessment and plan.  All questions were answered.  Sun Screen Education was given.   Return to Clinic in 1-2 years or sooner as needed.   Manisha Carias PA-C    ____________________________________________    CC: Derm Problem (Moon is here today for a FBSE; Pt reports moles on her back.)    HPI:  Ms. Moon Ordoñez is a(n) 44 year old female who presents today as a new patient for FBSC.    The patient reports that her last skin check was approx. 7 years ago. She previously has had bx done, all dermatopathology came back benign.   She recalls she did not wear strong sunscreen in her youth. She infrequently used tanning beds in highschool.   She notes she has many moles on her back that she would like checked.   She has a question about a cyst that is on her back. She notes that it sometimes becomes itchy.     Patient is otherwise feeling well, without additional skin concerns.    Labs Reviewed:  N/A    Physical Exam:  Vitals: There were no vitals taken for this visit.  SKIN: Full skin, which includes the head/face, both arms, chest, back, abdomen,both legs, genitalia and/or groin buttocks, digits and/or nails, was examined.  - Linear scar on central upper back from previous excision of severe DN, no signs of reoccurrence   - R upper back fairly palpable subcutaneous papule nodule consistent with sebaceous cyst  - There are dome shaped bright red papules on the trunk and extremities .   - Multiple regular brown pigmented macules and papules are identified on the trunk and extremities. .   - Scattered brown macules on sun exposed areas.  - Waxy stuck on papules and plaques on trunk and extremities.   - No other lesions of concern on areas examined.     Medications:  Current Outpatient  Medications   Medication Sig Dispense Refill     albuterol (PROAIR HFA/PROVENTIL HFA/VENTOLIN HFA) 108 (90 Base) MCG/ACT inhaler Inhale 2 puffs into the lungs every 6 hours as needed for shortness of breath, wheezing or cough. 18 g 0     levonorgestrel (MIRENA) 20 MCG/DAY IUD 1 each (20 mcg) by Intrauterine route once for 1 dose 1 each 0     predniSONE (DELTASONE) 20 MG tablet Take 1 tablet (20 mg) by mouth daily. 10 tablet 0     No current facility-administered medications for this visit.      Past Medical History:   Patient Active Problem List   Diagnosis     Screening for malignant neoplasm of cervix     Female stress incontinence     Past Medical History:   Diagnosis Date     NO ACTIVE PROBLEMS         CC Guadalupe Ray PA-C  [address] [place] [address] on close of this encounter.      Again, thank you for allowing me to participate in the care of your patient.      Sincerely,    Manisha Carias PA-C

## 2025-03-25 ENCOUNTER — TELEPHONE (OUTPATIENT)
Dept: DERMATOLOGY | Facility: CLINIC | Age: 45
End: 2025-03-25
Payer: COMMERCIAL

## 2025-03-25 NOTE — TELEPHONE ENCOUNTER
3/25 Left Voicemail (1st Attempt) and Sent Mychart (1st Attempt) for the patient to call back and schedule the following:    Appointment type: Return Dermatology  Provider: Jv  Return date: 1/6/2026  Specialty phone number: [phone]  Additional appointment(s) needed: na  Additonal Notes: na

## 2025-05-30 ENCOUNTER — OFFICE VISIT (OUTPATIENT)
Dept: FAMILY MEDICINE | Facility: CLINIC | Age: 45
End: 2025-05-30
Payer: COMMERCIAL

## 2025-05-30 VITALS
RESPIRATION RATE: 16 BRPM | HEART RATE: 72 BPM | WEIGHT: 155.2 LBS | DIASTOLIC BLOOD PRESSURE: 73 MMHG | TEMPERATURE: 97.6 F | SYSTOLIC BLOOD PRESSURE: 112 MMHG | OXYGEN SATURATION: 100 % | BODY MASS INDEX: 23.52 KG/M2 | HEIGHT: 68 IN

## 2025-05-30 DIAGNOSIS — Z23 ENCOUNTER FOR IMMUNIZATION: ICD-10-CM

## 2025-05-30 DIAGNOSIS — Z00.00 ROUTINE GENERAL MEDICAL EXAMINATION AT A HEALTH CARE FACILITY: Primary | ICD-10-CM

## 2025-05-30 LAB
ERYTHROCYTE [DISTWIDTH] IN BLOOD BY AUTOMATED COUNT: 12.6 % (ref 10–15)
HCT VFR BLD AUTO: 36.4 % (ref 35–47)
HGB BLD-MCNC: 12 G/DL (ref 11.7–15.7)
MCH RBC QN AUTO: 29.6 PG (ref 26.5–33)
MCHC RBC AUTO-ENTMCNC: 33 G/DL (ref 31.5–36.5)
MCV RBC AUTO: 90 FL (ref 78–100)
PLATELET # BLD AUTO: 310 10E3/UL (ref 150–450)
RBC # BLD AUTO: 4.06 10E6/UL (ref 3.8–5.2)
WBC # BLD AUTO: 10.8 10E3/UL (ref 4–11)

## 2025-05-30 PROCEDURE — 80061 LIPID PANEL: CPT | Performed by: PHYSICIAN ASSISTANT

## 2025-05-30 PROCEDURE — 90746 HEPB VACCINE 3 DOSE ADULT IM: CPT | Performed by: PHYSICIAN ASSISTANT

## 2025-05-30 PROCEDURE — 36415 COLL VENOUS BLD VENIPUNCTURE: CPT | Performed by: PHYSICIAN ASSISTANT

## 2025-05-30 PROCEDURE — 99396 PREV VISIT EST AGE 40-64: CPT | Performed by: PHYSICIAN ASSISTANT

## 2025-05-30 PROCEDURE — 3049F LDL-C 100-129 MG/DL: CPT | Performed by: PHYSICIAN ASSISTANT

## 2025-05-30 PROCEDURE — 85027 COMPLETE CBC AUTOMATED: CPT | Performed by: PHYSICIAN ASSISTANT

## 2025-05-30 PROCEDURE — 84443 ASSAY THYROID STIM HORMONE: CPT | Performed by: PHYSICIAN ASSISTANT

## 2025-05-30 PROCEDURE — 3074F SYST BP LT 130 MM HG: CPT | Performed by: PHYSICIAN ASSISTANT

## 2025-05-30 PROCEDURE — 1126F AMNT PAIN NOTED NONE PRSNT: CPT | Performed by: PHYSICIAN ASSISTANT

## 2025-05-30 PROCEDURE — 90471 IMMUNIZATION ADMIN: CPT | Mod: 59 | Performed by: PHYSICIAN ASSISTANT

## 2025-05-30 PROCEDURE — 3078F DIAST BP <80 MM HG: CPT | Performed by: PHYSICIAN ASSISTANT

## 2025-05-30 PROCEDURE — 80048 BASIC METABOLIC PNL TOTAL CA: CPT | Performed by: PHYSICIAN ASSISTANT

## 2025-05-30 SDOH — HEALTH STABILITY: PHYSICAL HEALTH: ON AVERAGE, HOW MANY MINUTES DO YOU ENGAGE IN EXERCISE AT THIS LEVEL?: 10 MIN

## 2025-05-30 SDOH — HEALTH STABILITY: PHYSICAL HEALTH: ON AVERAGE, HOW MANY DAYS PER WEEK DO YOU ENGAGE IN MODERATE TO STRENUOUS EXERCISE (LIKE A BRISK WALK)?: 3 DAYS

## 2025-05-30 ASSESSMENT — PAIN SCALES - GENERAL: PAINLEVEL_OUTOF10: NO PAIN (0)

## 2025-05-30 ASSESSMENT — SOCIAL DETERMINANTS OF HEALTH (SDOH): HOW OFTEN DO YOU GET TOGETHER WITH FRIENDS OR RELATIVES?: MORE THAN THREE TIMES A WEEK

## 2025-05-30 NOTE — NURSING NOTE
Prior to immunization administration, verified patients identity using patient s name and date of birth. Please see Immunization Activity for additional information.     Screening Questionnaire for Adult Immunization    Are you sick today?   No   Do you have allergies to medications, food, a vaccine component or latex?   No   Have you ever had a serious reaction after receiving a vaccination?   No   Do you have a long-term health problem with heart, lung, kidney, or metabolic disease (e.g., diabetes), asthma, a blood disorder, no spleen, complement component deficiency, a cochlear implant, or a spinal fluid leak?  Are you on long-term aspirin therapy?   No   Do you have cancer, leukemia, HIV/AIDS, or any other immune system problem?   No   Do you have a parent, brother, or sister with an immune system problem?   No   In the past 3 months, have you taken medications that affect  your immune system, such as prednisone, other steroids, or anticancer drugs; drugs for the treatment of rheumatoid arthritis, Crohn s disease, or psoriasis; or have you had radiation treatments?   No   Have you had a seizure, or a brain or other nervous system problem?   No   During the past year, have you received a transfusion of blood or blood    products, or been given immune (gamma) globulin or antiviral drug?   No   For women: Are you pregnant or is there a chance you could become       pregnant during the next month?   No   Have you received any vaccinations in the past 4 weeks?   No     Immunization questionnaire answers were all negative.      Patient instructed to remain in clinic for 15 minutes afterwards, and to report any adverse reactions.     Screening performed by Gabriel Cornejo MA on 5/30/2025 at 3:58 PM.

## 2025-05-30 NOTE — PROGRESS NOTES
Preventive Care Visit  [facility]  Guadalupe Ray PA-C, Physician Assistant - Medical  May 30, 2025      Assessment & Plan     Routine general medical examination at a health care facility  - REVIEW OF HEALTH MAINTENANCE PROTOCOL ORDERS  - TSH WITH FREE T4 REFLEX; Future  - CBC with platelets; Future  - Basic metabolic panel; Future  - Lipid panel reflex to direct LDL Non-fasting; Future  - TSH WITH FREE T4 REFLEX  - CBC with platelets  - Basic metabolic panel  - Lipid panel reflex to direct LDL Non-fasting    Encounter for immunization  - HEPATITIS B, ADULT 20+ (ENGERIX-B/RECOMBIVAX HB)    Counseling  Appropriate preventive services were addressed with this patient via screening, questionnaire, or discussion as appropriate for fall prevention, nutrition, physical activity, Tobacco-use cessation, social engagement, weight loss and cognition.  Checklist reviewing preventive services available has been given to the patient.  Reviewed patient's diet, addressing concerns and/or questions.   She is at risk for lack of exercise and has been provided with information to increase physical activity for the benefit of her well-being.           Rustam Mustafa is a 44 year old, presenting for the following:  Physical        5/30/2025     3:16 PM   Additional Questions   Roomed by Esthela LO      History of Present Illness-  Moon Ordoñez, 44-year-old female  - No new family history concerns  - No problems reported with Mirena, occasional spotting noted      HPI  Moon Ordoñez, 44-year-old female  - No new family history concerns  - No problems reported with Mirena, occasional spotting noted  - Not immune to hepatitis, previously identified  - Discussion about premenopause symptoms, including hot flashes, night sweats, mood changes, insomnia, hair loss, brain fog, joint pains, and changes in metabolism  - Scheduled for a mammogram, frequency discussed  Advance Care  Planning    Discussed advance care planning with patient; informed AVS has link to [employer].        5/30/2025   General Health   How would you rate your overall physical health? Good   Feel stress (tense, anxious, or unable to sleep) Only a little   (!) STRESS CONCERN      5/30/2025   Nutrition   Three or more servings of calcium each day? Yes   Diet: Regular (no restrictions)   How many servings of fruit and vegetables per day? (!) 2-3   How many sweetened beverages each day? 0-1         5/30/2025   Exercise   Days per week of moderate/strenous exercise 3 days   Average minutes spent exercising at this level 10 min         5/30/2025   Social Factors   Frequency of gathering with friends or relatives More than three times a week   Worry food won't last until get money to buy more No   Food not last or not have enough money for food? No   Do you have housing? (Housing is defined as stable permanent housing and does not include staying outside in a car, in a tent, in an abandoned building, in an overnight shelter, or couch-surfing.) Yes   Are you worried about losing your housing? No   Lack of transportation? No   Unable to get utilities (heat,electricity)? No         5/30/2025   Dental   Dentist two times every year? Yes           Today's PHQ-2 Score:       1/6/2025    10:53 AM   PHQ-2 ( 1999 [employer])   Q1: Little interest or pleasure in doing things 0   Q2: Feeling down, depressed or hopeless 0   PHQ-2 Score 0         5/30/2025   Substance Use   Alcohol more than 3/day or more than 7/wk No   Do you use any other substances recreationally? No     Social History     Tobacco Use    Smoking status: Never    Smokeless tobacco: Never   Vaping Use    Vaping status: Never Used   Substance Use Topics    Alcohol use: No    Drug use: No           4/10/2024   LAST FHS-7 RESULTS   1st degree relative breast or ovarian cancer No   Any relative bilateral breast cancer No   Any male have breast cancer No   Any ONE woman have  "BOTH breast AND ovarian cancer No   Any woman with breast cancer before 50yrs No   2 or more relatives with breast AND/OR ovarian cancer No   2 or more relatives with breast AND/OR bowel cancer No        Mammogram Screening - Mammogram every 1-2 years updated in Health Maintenance based on mutual decision making        5/30/2025   STI Screening   New sexual partner(s) since last STI/HIV test? No     History of abnormal Pap smear: No - age 30- 64 PAP with HPV every 5 years recommended        Latest Ref Rng & Units 4/21/2023     3:16 PM 8/8/2018     1:14 PM 8/8/2018    12:56 PM   PAP / HPV   PAP  Negative for Intraepithelial Lesion or Malignancy (NILM)      PAP (Historical)    NIL    HPV 16 DNA Negative Negative  Negative     HPV 18 DNA Negative Negative  Negative     Other HR HPV Negative Negative  Negative       ASCVD Risk   The 10-year ASCVD risk score (Lux MANZANO, et al., 2019) is: 0.5%    Values used to calculate the score:      Age: 44 years      Sex: Female      Is Non- : No      Diabetic: No      Tobacco smoker: No      Systolic Blood Pressure: 112 mmHg      Is BP treated: No      HDL Cholesterol: 47 mg/dL      Total Cholesterol: 172 mg/dL        5/30/2025   Contraception/Family Planning   Questions about contraception or family planning No        Reviewed and updated as needed this visit by Provider    Allergies  Meds  Problems  Med Hx  Surg Hx  Fam Hx               Objective    Exam  /73 (BP Location: Right arm, Patient Position: Sitting, Cuff Size: Adult Regular)   Pulse 72   Temp 97.6  F (36.4  C) (Temporal)   Resp 16   Ht 1.715 m (5' 7.5\")   Wt 70.4 kg (155 lb 3.2 oz)   SpO2 100%   BMI 23.95 kg/m     Estimated body mass index is 23.95 kg/m  as calculated from the following:    Height as of this encounter: 1.715 m (5' 7.5\").    Weight as of this encounter: 70.4 kg (155 lb 3.2 oz).    Physical Exam  GENERAL: alert and no distress  EYES: Eyes grossly normal " to inspection, PERRL and conjunctivae and sclerae normal  HENT: ear canals and TM's normal, nose and mouth without ulcers or lesions  NECK: no adenopathy, no asymmetry, masses, or scars  RESP: lungs clear to auscultation - no rales, rhonchi or wheezes  CV: regular rate and rhythm, normal S1 S2, no S3 or S4, no murmur, click or rub, no peripheral edema  ABDOMEN: soft, nontender, no hepatosplenomegaly, no masses and bowel sounds normal  MS: no gross musculoskeletal defects noted, no edema  SKIN: no suspicious lesions or rashes  NEURO: Normal strength and tone, mentation intact and speech normal  PSYCH: mentation appears normal, affect normal/bright        Signed Electronically by: Guadalupe Ray PA-C

## 2025-05-30 NOTE — PATIENT INSTRUCTIONS
Patient Education   Preventive Care Advice   This is general advice given by our system to help you stay healthy. However, your care team may have specific advice just for you. Please talk to your care team about your preventive care needs.  Nutrition  Eat 5 or more servings of fruits and vegetables each day.  Try wheat bread, brown rice and whole grain pasta (instead of white bread, rice, and pasta).  Get enough calcium and vitamin D. Check the label on foods and aim for 100% of the RDA (recommended daily allowance).  Lifestyle  Exercise at least 150 minutes each week  (30 minutes a day, 5 days a week).  Do muscle strengthening activities 2 days a week. These help control your weight and prevent disease.  No smoking.  Wear sunscreen to prevent skin cancer.  Have a dental exam and cleaning every 6 months.  Yearly exams  See your health care team every year to talk about:  Any changes in your health.  Any medicines your care team has prescribed.  Preventive care, family planning, and ways to prevent chronic diseases.  Shots (vaccines)   HPV shots (up to age 26), if you've never had them before.  Hepatitis B shots (up to age 59), if you've never had them before.  COVID-19 shot: Get this shot when it's due.  Flu shot: Get a flu shot every year.  Tetanus shot: Get a tetanus shot every 10 years.  Pneumococcal, hepatitis A, and RSV shots: Ask your care team if you need these based on your risk.  Shingles shot (for age 50 and up)  General health tests  Diabetes screening:  Starting at age 35, Get screened for diabetes at least every 3 years.  If you are younger than age 35, ask your care team if you should be screened for diabetes.  Cholesterol test: At age 39, start having a cholesterol test every 5 years, or more often if advised.  Bone density scan (DEXA): At age 50, ask your care team if you should have this scan for osteoporosis (brittle bones).  Hepatitis C: Get tested at least once in your life.  STIs (sexually  transmitted infections)  Before age 24: Ask your care team if you should be screened for STIs.  After age 24: Get screened for STIs if you're at risk. You are at risk for STIs (including HIV) if:  You are sexually active with more than one person.  You don't use condoms every time.  You or a partner was diagnosed with a sexually transmitted infection.  If you are at risk for HIV, ask about PrEP medicine to prevent HIV.  Get tested for HIV at least once in your life, whether you are at risk for HIV or not.  Cancer screening tests  Cervical cancer screening: If you have a cervix, begin getting regular cervical cancer screening tests starting at age 21.  Breast cancer scan (mammogram): If you've ever had breasts, begin having regular mammograms starting at age 40. This is a scan to check for breast cancer.  Colon cancer screening: It is important to start screening for colon cancer at age 45.  Have a colonoscopy test every 10 years (or more often if you're at risk) Or, ask your provider about stool tests like a FIT test every year or Cologuard test every 3 years.  To learn more about your testing options, visit:   .  For help making a decision, visit:   https://bit.ly/rj74552.  Prostate cancer screening test: If you have a prostate, ask your care team if a prostate cancer screening test (PSA) at age 55 is right for you.  Lung cancer screening: If you are a current or former smoker ages 50 to 80, ask your care team if ongoing lung cancer screenings are right for you.  For informational purposes only. Not to replace the advice of your health care provider. Copyright   2023 [employer]. All rights reserved. Clinically reviewed by the [facility]. [employer] [id number] - REV 01/24.

## 2025-05-31 LAB
ANION GAP SERPL CALCULATED.3IONS-SCNC: 9 MMOL/L (ref 7–15)
BUN SERPL-MCNC: 15 MG/DL (ref 6–20)
CALCIUM SERPL-MCNC: 8.6 MG/DL (ref 8.8–10.4)
CHLORIDE SERPL-SCNC: 106 MMOL/L (ref 98–107)
CHOLEST SERPL-MCNC: 172 MG/DL
CREAT SERPL-MCNC: 0.8 MG/DL (ref 0.51–0.95)
EGFRCR SERPLBLD CKD-EPI 2021: >90 ML/MIN/1.73M2
FASTING STATUS PATIENT QL REPORTED: NO
FASTING STATUS PATIENT QL REPORTED: NO
GLUCOSE SERPL-MCNC: 86 MG/DL (ref 70–99)
HCO3 SERPL-SCNC: 23 MMOL/L (ref 22–29)
HDLC SERPL-MCNC: 47 MG/DL
LDLC SERPL CALC-MCNC: 106 MG/DL
NONHDLC SERPL-MCNC: 125 MG/DL
POTASSIUM SERPL-SCNC: 4 MMOL/L (ref 3.4–5.3)
SODIUM SERPL-SCNC: 138 MMOL/L (ref 135–145)
TRIGL SERPL-MCNC: 94 MG/DL
TSH SERPL DL<=0.005 MIU/L-ACNC: 3.35 UIU/ML (ref 0.3–4.2)

## 2025-06-03 ENCOUNTER — RESULTS FOLLOW-UP (OUTPATIENT)
Dept: FAMILY MEDICINE | Facility: CLINIC | Age: 45
End: 2025-06-03

## 2025-06-16 ENCOUNTER — HOSPITAL ENCOUNTER (OUTPATIENT)
Dept: MAMMOGRAPHY | Facility: CLINIC | Age: 45
Discharge: HOME | End: 2025-06-16
Attending: PHYSICIAN ASSISTANT | Admitting: PHYSICIAN ASSISTANT
Payer: COMMERCIAL

## 2025-06-16 DIAGNOSIS — Z12.31 VISIT FOR SCREENING MAMMOGRAM: ICD-10-CM

## 2025-06-16 PROCEDURE — 77063 BREAST TOMOSYNTHESIS BI: CPT
